# Patient Record
Sex: MALE | Race: BLACK OR AFRICAN AMERICAN | NOT HISPANIC OR LATINO | URBAN - METROPOLITAN AREA
[De-identification: names, ages, dates, MRNs, and addresses within clinical notes are randomized per-mention and may not be internally consistent; named-entity substitution may affect disease eponyms.]

---

## 2019-02-25 VITALS
OXYGEN SATURATION: 96 % | HEIGHT: 74 IN | SYSTOLIC BLOOD PRESSURE: 159 MMHG | HEART RATE: 94 BPM | WEIGHT: 197.09 LBS | DIASTOLIC BLOOD PRESSURE: 95 MMHG | RESPIRATION RATE: 16 BRPM | TEMPERATURE: 99 F

## 2019-02-25 NOTE — PRE-OP CHECKLIST - IV STARTED
 Sinus complaint     Substance abuse     Swelling        Past Surgical History:   Procedure Laterality Date    ADENOIDECTOMY      APPENDECTOMY      CARDIOVERSION      x 2     CHOLECYSTECTOMY      COLONOSCOPY  05/17/2013    Dr. Moisés Barber COLONOSCOPY  03/22/2012    Dr Tyrone Jha yr recall    COLONOSCOPY  06/30/2008    Dr Yvan Gallegos, 3 yr recall    HEMICOLECTOMY Right 1998    2 FT OF COLON REMOVED DUE TO CA    HYSTERECTOMY      [place] COLONOSCOPY W/BIOPSY SINGLE/MULTIPLE N/A 6/6/2017    Dr Timothy Jean Baptiste colon anastomosis-Tubular AP (-) dysplasia x 2--3 yr recall    TONSILLECTOMY      TUMOR REMOVAL      UPPER GASTROINTESTINAL ENDOSCOPY  05/17/2013    Dr. Dylon Salas ulcer disease-Chelo (+)    UPPER GASTROINTESTINAL ENDOSCOPY  03/28/2012    Dr Darinel Ponce antral ulceration with a visible vessel       Recent Hospitalizations  ·     Significant Injuries  ·     Family History   Problem Relation Age of Onset    No Known Problems Mother     No Known Problems Father     Colon Cancer Daughter     Colon Polyps Daughter     Esophageal Cancer Neg Hx     Liver Cancer Neg Hx     Rectal Cancer Neg Hx     Liver Disease Neg Hx     Stomach Cancer Neg Hx        Social History  History   Smoking Status    Never Smoker   Smokeless Tobacco    Never Used     History   Alcohol Use No     History   Drug Use No         Current Outpatient Prescriptions   Medication Sig Dispense Refill    furosemide (LASIX) 40 MG tablet TAKE 1 TABLET BY MOUTH DAILY 90 tablet 2    sotalol (BETAPACE) 80 MG tablet Take 1 tablet by mouth 2 times daily 180 tablet 3    Neomycin-Polymyxin-Dexameth 0.1 % OINT Place 0.5 inches into the left eye 3 times daily      fluticasone (FLONASE) 50 MCG/ACT nasal spray 1 spray by Nasal route 2 times daily      vitamin D (ERGOCALCIFEROL) 41400 UNITS CAPS capsule Take 50,000 Units by mouth Twice a Week      rivaroxaban (XARELTO) 20 MG TABS tablet Take 20 mg by mouth daily (with breakfast)      Cetirizine HCl 10 MG CAPS Take 10 mg by mouth daily      pioglitazone (ACTOS) 30 MG tablet Take 30 mg by mouth daily      gabapentin (NEURONTIN) 300 MG capsule Take 300 mg by mouth 2 times daily      atorvastatin (LIPITOR) 40 MG tablet Take 40 mg by mouth daily      METFORMIN HCL PO Take 1,000 mg by mouth 2 times daily       Sertraline HCl (ZOLOFT PO) Take 50 mg by mouth daily        No current facility-administered medications for this visit. Allergies:  Review of patient's allergies indicates no known allergies.     REVIEW OF SYSTEMS     Constitutional: []Fever []Sweats []Chills [] Recent Injury   [x] Denies all unless marked  HENT:[]Headache  [] Head Injury  [] Sore Throat  [] Ear Pain  [] Dizziness [] Hearing Loss   [x] Denies all unless marked  Spine:  [] Neck pain  [] Back pain  [] Sciaticia  [x] Denies all unless marked  Cardiovascular:[]Chest Pain []Palpitations [] Heart Disease  [x] Denies all unless marked  Pulmonary: []Shortness of Breath []Cough   [x] Denies all unless marked  Gastrointestinal:  []Abdominal Pain  []Blood in Stool  []Diarrhea []Constipation []Nausea  []Vomiting  [x] Denies all unless marked  Genitourinary:  [] Dysuria [] Frequency  [] Incontinence [] Urgency   [x] Denies all unless marked  Musculoskeletal: [] Arthralgia  [] Myalgias [] Muscle cramps  [] Muscle twitches   [x] Denies all unless marked   Extremities:   [] Pain   [] Swelling   [x] Denies all unless marked  Skin:[] Rash  [] Color Change  [x] Denies all unless marked  Neurological:[] Visual Disturbance [] Double Vision [] Slurred Speech [] Trouble swallowing  [] Vertigo [] Tingling [] Numbness [] Weakness [] Loss of Balance   [] Loss of Consciousness [] Memory Loss  [x] Denies all unless marked  Psychiatric/Behavioral:[] Depression [] Anxiety  [x] Denies all unless marked  Sleep: []  Insomnia [] Sleep Disturbance [] Snoring [x] Restless Legs [] Daytime Sleepiness [x] Sleep Apnea  [x] Denies all no

## 2019-02-25 NOTE — PRE-OP CHECKLIST - DNR CLARIFICATION FORM COMPLETED
Patient arrives with complaints of fevers and chills for the past 4 days.  States that he has been having increased weakness and dizziness.  Denies cough or cold symptoms.  Denies urinary symptoms.  Denies nausea or vomiting, denies diarrhea.  Patient is alert/oriented.  States that fevers have persisted despite taking antipyretics.  Reports that he had dental surgery a few weeks ago but denies abscess or drainage.  
n/a

## 2019-02-26 ENCOUNTER — INPATIENT (INPATIENT)
Facility: HOSPITAL | Age: 44
LOS: 4 days | Discharge: HOME | DRG: 460 | End: 2019-03-03
Attending: ORTHOPAEDIC SURGERY | Admitting: ORTHOPAEDIC SURGERY
Payer: OTHER MISCELLANEOUS

## 2019-02-26 DIAGNOSIS — M54.16 RADICULOPATHY, LUMBAR REGION: ICD-10-CM

## 2019-02-26 RX ORDER — SENNA PLUS 8.6 MG/1
2 TABLET ORAL AT BEDTIME
Qty: 0 | Refills: 0 | Status: DISCONTINUED | OUTPATIENT
Start: 2019-02-26 | End: 2019-03-03

## 2019-02-26 RX ORDER — HYDROMORPHONE HYDROCHLORIDE 2 MG/ML
0.5 INJECTION INTRAMUSCULAR; INTRAVENOUS; SUBCUTANEOUS
Qty: 0 | Refills: 0 | Status: DISCONTINUED | OUTPATIENT
Start: 2019-02-26 | End: 2019-02-26

## 2019-02-26 RX ORDER — HYDROMORPHONE HYDROCHLORIDE 2 MG/ML
30 INJECTION INTRAMUSCULAR; INTRAVENOUS; SUBCUTANEOUS
Qty: 0 | Refills: 0 | Status: DISCONTINUED | OUTPATIENT
Start: 2019-02-26 | End: 2019-02-26

## 2019-02-26 RX ORDER — CEFAZOLIN SODIUM 1 G
2000 VIAL (EA) INJECTION EVERY 8 HOURS
Qty: 0 | Refills: 0 | Status: COMPLETED | OUTPATIENT
Start: 2019-02-26 | End: 2019-02-27

## 2019-02-26 RX ORDER — GABAPENTIN 400 MG/1
300 CAPSULE ORAL THREE TIMES A DAY
Qty: 0 | Refills: 0 | Status: DISCONTINUED | OUTPATIENT
Start: 2019-02-26 | End: 2019-02-26

## 2019-02-26 RX ORDER — DEXAMETHASONE 0.5 MG/5ML
10 ELIXIR ORAL EVERY 8 HOURS
Qty: 0 | Refills: 0 | Status: COMPLETED | OUTPATIENT
Start: 2019-02-26 | End: 2019-02-27

## 2019-02-26 RX ORDER — GABAPENTIN 400 MG/1
1 CAPSULE ORAL
Qty: 0 | Refills: 0 | COMMUNITY

## 2019-02-26 RX ORDER — SODIUM CHLORIDE 9 MG/ML
1000 INJECTION, SOLUTION INTRAVENOUS
Qty: 0 | Refills: 0 | Status: DISCONTINUED | OUTPATIENT
Start: 2019-02-26 | End: 2019-03-03

## 2019-02-26 RX ORDER — OXYCODONE HYDROCHLORIDE 5 MG/1
10 TABLET ORAL EVERY 4 HOURS
Qty: 0 | Refills: 0 | Status: DISCONTINUED | OUTPATIENT
Start: 2019-02-26 | End: 2019-03-01

## 2019-02-26 RX ORDER — ACETAMINOPHEN 500 MG
650 TABLET ORAL EVERY 4 HOURS
Qty: 0 | Refills: 0 | Status: DISCONTINUED | OUTPATIENT
Start: 2019-02-26 | End: 2019-03-03

## 2019-02-26 RX ORDER — CEFAZOLIN SODIUM 1 G
2000 VIAL (EA) INJECTION EVERY 8 HOURS
Qty: 0 | Refills: 0 | Status: DISCONTINUED | OUTPATIENT
Start: 2019-02-26 | End: 2019-02-26

## 2019-02-26 RX ORDER — OXYCODONE HYDROCHLORIDE 5 MG/1
5 TABLET ORAL EVERY 4 HOURS
Qty: 0 | Refills: 0 | Status: DISCONTINUED | OUTPATIENT
Start: 2019-02-26 | End: 2019-03-01

## 2019-02-26 RX ORDER — HYDROMORPHONE HYDROCHLORIDE 2 MG/ML
0.5 INJECTION INTRAMUSCULAR; INTRAVENOUS; SUBCUTANEOUS EVERY 4 HOURS
Qty: 0 | Refills: 0 | Status: DISCONTINUED | OUTPATIENT
Start: 2019-02-26 | End: 2019-03-03

## 2019-02-26 RX ORDER — DOCUSATE SODIUM 100 MG
100 CAPSULE ORAL THREE TIMES A DAY
Qty: 0 | Refills: 0 | Status: DISCONTINUED | OUTPATIENT
Start: 2019-02-26 | End: 2019-03-03

## 2019-02-26 RX ORDER — NALOXONE HYDROCHLORIDE 4 MG/.1ML
0.1 SPRAY NASAL
Qty: 0 | Refills: 0 | Status: DISCONTINUED | OUTPATIENT
Start: 2019-02-26 | End: 2019-02-26

## 2019-02-26 RX ORDER — ONDANSETRON 8 MG/1
4 TABLET, FILM COATED ORAL EVERY 6 HOURS
Qty: 0 | Refills: 0 | Status: DISCONTINUED | OUTPATIENT
Start: 2019-02-26 | End: 2019-02-26

## 2019-02-26 RX ORDER — DICLOFENAC SODIUM 75 MG/1
1 TABLET, DELAYED RELEASE ORAL
Qty: 0 | Refills: 0 | COMMUNITY

## 2019-02-26 RX ADMIN — HYDROMORPHONE HYDROCHLORIDE 0.5 MILLIGRAM(S): 2 INJECTION INTRAMUSCULAR; INTRAVENOUS; SUBCUTANEOUS at 22:25

## 2019-02-26 RX ADMIN — HYDROMORPHONE HYDROCHLORIDE 0.5 MILLIGRAM(S): 2 INJECTION INTRAMUSCULAR; INTRAVENOUS; SUBCUTANEOUS at 22:40

## 2019-02-26 NOTE — H&P ADULT - NSHPPHYSICALEXAM_GEN_ALL_CORE
MSK:  Decreased ROM of lumbar spine secondary to pain.  TA/Gastro 5/5 b/l  EHL/FHL 5/5 RLE  EHL/FHL 4/5 LLE  DP's palpable, brisk cap refill b/l  Gross sensation to light touch mildly diminished on anterolateral aspect of LLE  Remainder of PE as per medical clearance

## 2019-02-26 NOTE — H&P ADULT - PROBLEM SELECTOR PLAN 1
Admit to orthopedics.  Presents for elective lumbar decompression and fusion L4-S1.   Medically optimized and cleared for surgery by Dr. Danis Daugherty.

## 2019-02-26 NOTE — H&P ADULT - HISTORY OF PRESENT ILLNESS
42yo male s/p mechanical fall at work in august 13 2018 co lumbar spine pain radiating down lower extremities >6months.  Pt states pain radiates down both lower extremities but is more prominent down LLE down to his left foot.  Pt admits to numbness and tingling down LLE but not in RLE.Pt states pain began immediately after fall and has progressed, pt states that it is worse when standing or sitting for prolonged periods of time. Pt takes diclofenac, ibuprofen, and gabapentin for his lumbar spine pain as prescribed by his pain management doctor without relief. Pt ambulates with a cane. Pt has attempted and failed conservative treatment for his  lumbar spine pain consisting of PT.  Pt not on any anticoagulation meds and denies hx of DVT.  Pt admits to eating "a few french fries" at 9AM this morning. Pt denies fever, chills, recent illness, CP, SOB, N/V, or any other complaints.   Pt presents for lumbar decompression and fusion L4-S1.

## 2019-02-26 NOTE — H&P ADULT - NSHPLABSRESULTS_GEN_ALL_CORE
Preop cbc, bmp, pt/inr, ptt, ua wnl reviewed by med clearance  preop ekg wnl reviewed by med clearance

## 2019-02-26 NOTE — BRIEF OPERATIVE NOTE - PROCEDURE
<<-----Click on this checkbox to enter Procedure Lumbar fusion with pedicle screw  02/26/2019    Active  ANTONIO

## 2019-02-27 LAB
ANION GAP SERPL CALC-SCNC: 13 MMOL/L — SIGNIFICANT CHANGE UP (ref 5–17)
BASOPHILS # BLD AUTO: 0 K/UL — SIGNIFICANT CHANGE UP (ref 0–0.2)
BASOPHILS NFR BLD AUTO: 0 % — SIGNIFICANT CHANGE UP (ref 0–2)
BUN SERPL-MCNC: 15 MG/DL — SIGNIFICANT CHANGE UP (ref 7–23)
CALCIUM SERPL-MCNC: 9.9 MG/DL — SIGNIFICANT CHANGE UP (ref 8.4–10.5)
CHLORIDE SERPL-SCNC: 105 MMOL/L — SIGNIFICANT CHANGE UP (ref 96–108)
CO2 SERPL-SCNC: 23 MMOL/L — SIGNIFICANT CHANGE UP (ref 22–31)
CREAT SERPL-MCNC: 1.27 MG/DL — SIGNIFICANT CHANGE UP (ref 0.5–1.3)
EOSINOPHIL # BLD AUTO: 0 K/UL — SIGNIFICANT CHANGE UP (ref 0–0.5)
EOSINOPHIL NFR BLD AUTO: 0 % — SIGNIFICANT CHANGE UP (ref 0–6)
GLUCOSE SERPL-MCNC: 138 MG/DL — HIGH (ref 70–99)
HCT VFR BLD CALC: 38.9 % — LOW (ref 39–50)
HGB BLD-MCNC: 12.6 G/DL — LOW (ref 13–17)
IMM GRANULOCYTES NFR BLD AUTO: 0.5 % — SIGNIFICANT CHANGE UP (ref 0–1.5)
LYMPHOCYTES # BLD AUTO: 0.62 K/UL — LOW (ref 1–3.3)
LYMPHOCYTES # BLD AUTO: 7.2 % — LOW (ref 13–44)
MCHC RBC-ENTMCNC: 28.8 PG — SIGNIFICANT CHANGE UP (ref 27–34)
MCHC RBC-ENTMCNC: 32.4 GM/DL — SIGNIFICANT CHANGE UP (ref 32–36)
MCV RBC AUTO: 89 FL — SIGNIFICANT CHANGE UP (ref 80–100)
MONOCYTES # BLD AUTO: 0.11 K/UL — SIGNIFICANT CHANGE UP (ref 0–0.9)
MONOCYTES NFR BLD AUTO: 1.3 % — LOW (ref 2–14)
NEUTROPHILS # BLD AUTO: 7.81 K/UL — HIGH (ref 1.8–7.4)
NEUTROPHILS NFR BLD AUTO: 91 % — HIGH (ref 43–77)
NRBC # BLD: 0 /100 WBCS — SIGNIFICANT CHANGE UP (ref 0–0)
PLATELET # BLD AUTO: 257 K/UL — SIGNIFICANT CHANGE UP (ref 150–400)
POTASSIUM SERPL-MCNC: 5 MMOL/L — SIGNIFICANT CHANGE UP (ref 3.5–5.3)
POTASSIUM SERPL-SCNC: 5 MMOL/L — SIGNIFICANT CHANGE UP (ref 3.5–5.3)
RBC # BLD: 4.37 M/UL — SIGNIFICANT CHANGE UP (ref 4.2–5.8)
RBC # FLD: 13.2 % — SIGNIFICANT CHANGE UP (ref 10.3–14.5)
SODIUM SERPL-SCNC: 141 MMOL/L — SIGNIFICANT CHANGE UP (ref 135–145)
WBC # BLD: 8.58 K/UL — SIGNIFICANT CHANGE UP (ref 3.8–10.5)
WBC # FLD AUTO: 8.58 K/UL — SIGNIFICANT CHANGE UP (ref 3.8–10.5)

## 2019-02-27 RX ADMIN — HYDROMORPHONE HYDROCHLORIDE 0.5 MILLIGRAM(S): 2 INJECTION INTRAMUSCULAR; INTRAVENOUS; SUBCUTANEOUS at 05:57

## 2019-02-27 RX ADMIN — OXYCODONE HYDROCHLORIDE 10 MILLIGRAM(S): 5 TABLET ORAL at 10:30

## 2019-02-27 RX ADMIN — OXYCODONE HYDROCHLORIDE 10 MILLIGRAM(S): 5 TABLET ORAL at 01:20

## 2019-02-27 RX ADMIN — Medication 100 MILLIGRAM(S): at 14:43

## 2019-02-27 RX ADMIN — OXYCODONE HYDROCHLORIDE 10 MILLIGRAM(S): 5 TABLET ORAL at 22:30

## 2019-02-27 RX ADMIN — Medication 2000 MILLIGRAM(S): at 00:34

## 2019-02-27 RX ADMIN — Medication 100 MILLIGRAM(S): at 05:16

## 2019-02-27 RX ADMIN — OXYCODONE HYDROCHLORIDE 10 MILLIGRAM(S): 5 TABLET ORAL at 18:30

## 2019-02-27 RX ADMIN — SENNA PLUS 2 TABLET(S): 8.6 TABLET ORAL at 21:54

## 2019-02-27 RX ADMIN — Medication 102 MILLIGRAM(S): at 17:39

## 2019-02-27 RX ADMIN — Medication 100 MILLIGRAM(S): at 21:54

## 2019-02-27 RX ADMIN — OXYCODONE HYDROCHLORIDE 10 MILLIGRAM(S): 5 TABLET ORAL at 09:52

## 2019-02-27 RX ADMIN — Medication 102 MILLIGRAM(S): at 08:56

## 2019-02-27 RX ADMIN — Medication 102 MILLIGRAM(S): at 00:55

## 2019-02-27 RX ADMIN — Medication 2000 MILLIGRAM(S): at 08:56

## 2019-02-27 RX ADMIN — OXYCODONE HYDROCHLORIDE 10 MILLIGRAM(S): 5 TABLET ORAL at 17:38

## 2019-02-27 RX ADMIN — OXYCODONE HYDROCHLORIDE 10 MILLIGRAM(S): 5 TABLET ORAL at 21:55

## 2019-02-27 RX ADMIN — HYDROMORPHONE HYDROCHLORIDE 0.5 MILLIGRAM(S): 2 INJECTION INTRAMUSCULAR; INTRAVENOUS; SUBCUTANEOUS at 05:16

## 2019-02-27 RX ADMIN — OXYCODONE HYDROCHLORIDE 10 MILLIGRAM(S): 5 TABLET ORAL at 14:39

## 2019-02-27 RX ADMIN — OXYCODONE HYDROCHLORIDE 10 MILLIGRAM(S): 5 TABLET ORAL at 00:39

## 2019-02-27 RX ADMIN — OXYCODONE HYDROCHLORIDE 10 MILLIGRAM(S): 5 TABLET ORAL at 13:19

## 2019-02-27 NOTE — DISCHARGE NOTE PROVIDER - NSDCFUADDINST_GEN_ALL_CORE_FT
No strenuous activity (bending/twisting), heavy lifting, driving or returning to work until cleared by MD.  Change dressing daily with gauze/tape till post-op day #5, then leave incision open to air.  You may shower post-op day#5, keep incision clean and dry.   Try to have regular bowel movements, take stool softener or laxative if necessary.  May take pepcid or zantac for upset stomach.  May apply ice to affected areas to decrease swelling.  Call to schedule an appt with Dr. Daugherty for follow up, if you have staples or sutures they will be removed in office.  Contact your doctor if you experience: fever greater than 101.5, chills, chest pain, difficulty breathing, redness or excessive drainage around the incision, other concerns.  Follow up with your primary care provider.

## 2019-02-27 NOTE — PHYSICAL THERAPY INITIAL EVALUATION ADULT - GENERAL OBSERVATIONS, REHAB EVAL
Patient received semi-supine in no acute distress on room air, +Telemetry, +SCDs, +Hemovac. Cleared by JULIO Bowie

## 2019-02-27 NOTE — DISCHARGE NOTE PROVIDER - NSDCCPCAREPLAN_GEN_ALL_CORE_FT
PRINCIPAL DISCHARGE DIAGNOSIS  Problem: Lumbar radiculopathy  Assessment and Plan of Treatment: improvement s/p PSF L4-S1

## 2019-02-27 NOTE — DISCHARGE NOTE PROVIDER - HOSPITAL COURSE
Admitted    Surgery - PSF L4-S1    Renetta-op Antibiotics    Pain control    DVT prophylaxis    OOB/Physical Therapy

## 2019-02-27 NOTE — DISCHARGE NOTE PROVIDER - CARE PROVIDER_API CALL
Raf Daugherty (DO)  Orthopaedic Surgery  [address] [place], [place] [address]  Phone: [phone]  Fax: [phone]  Follow Up Time: 2 weeks

## 2019-02-27 NOTE — PHYSICAL THERAPY INITIAL EVALUATION ADULT - GAIT DEVIATIONS NOTED, PT EVAL
decreased federico/decreased stride length/decreased step length/decreased velocity of limb motion/increased time in double stance/decreased swing-to-stance ratio

## 2019-02-27 NOTE — PROGRESS NOTE ADULT - SUBJECTIVE AND OBJECTIVE BOX
pt seen and examined nad avss    pe dressing cdi  nvi  power5/5   sensation grossly intact  no calf tenderness    imp sp psf    plan   ambulation pt   pain control  scd  drain  xrays

## 2019-02-27 NOTE — DISCHARGE NOTE PROVIDER - NSDCACTIVITY_GEN_ALL_CORE
Stairs allowed/Walking - Outdoors allowed/Do not drive or operate machinery/Walking - Indoors allowed/No heavy lifting/straining

## 2019-02-27 NOTE — PROGRESS NOTE ADULT - SUBJECTIVE AND OBJECTIVE BOX
Pain Management Progress Note - [facility] [phone]      HPI: Patient seen and examined today in preop, patient in NAD. Patient scheduled for Lumbar Decompression and fusion L4-S1 post op day 1, patient complains of lower back pain is addressed with current pain medication regimen. Patient Axox3, denies n,v, no s/s of oversedation.           Pain is ___ sharp _x___dull ___burning _x__achy ___ Intensity: ____ mild _x__mod __x_severe     Location __x__surgical site ____cervical __x___lumbar ____abd ____upper ext____lower ext    Worse with _x_activity __x__movement _____physical therapy___ Rest    Improved with __x__medication _x___rest ____physical therapy      HYDROmorphone PCA (1 mG/mL)  HYDROmorphone PCA (1 mG/mL) Rescue Clinician Bolus  naloxone Injectable  ondansetron Injectable  gabapentin  lactated ringers.  ceFAZolin   IVPB  dexamethasone  IVPB  aluminum hydroxide/magnesium hydroxide/simethicone Suspension  ondansetron Injectable  docusate sodium  senna  ceFAZolin  Injectable.  acetaminophen   Tablet ..  oxyCODONE    IR  HYDROmorphone  Injectable      ROS: Const:  _-__febrile   Eyes:___ENT:___CV: _-__chest pain  Resp: _-___sob  GI:_-__nausea _-__vomiting ___abd pain ___npo ___clears _x_full diet __bm  :___ Musk: x___pain _x__spasm  Skin:___ Neuro:  _-__ofavlwwl__-_qjynvliud_r__ numbness _x__weakness __-_paresth  Psych:_-_anxiety  Endo:___ Heme:___Allergy:_________, _x__all others reviewed and negative        PAST MEDICAL & SURGICAL HISTORY:  Lumbar radiculopathy  Lumbar radiculopathy  No significant past surgical history      02-27 @ 05:5568 mL/min/1.73M2      Hemoglobin: 12.6 g/dL (02-27 @ 05:55)        T(C): 36.8 (02-27-19 @ 08:13), Max: 37.1 (02-27-19 @ 00:05)  HR: 97 (02-27-19 @ 08:13) (76 - 98)  BP: 130/70 (02-27-19 @ 08:13) (119/76 - 152/94)  RR: 17 (02-27-19 @ 08:13) (13 - 27)  SpO2: 97% (02-27-19 @ 08:13) (97% - 100%)  Wt(kg): --       PHYSICAL EXAM:  Gen Appearance: _x__no acute distress _x__appropriate        Neuro: _x__SILT feet____ EOM Intact Psych: AAOX_3_, x___mood/affect appropriate        Eyes: _x__conjunctiva WNL  ___x__ Pupils equal and round        ENT: _x__ears and nose atraumatic_x__ Hearing grossly intact        Neck: _x__trachea midline, no visible masses ___thyroid without palpable mass    Resp: _x__Nml WOB____No tactile fremitus ___clear to auscultation    Cardio: _x__extremities free from edema __x__pedal pulses palpable    GI/Abdomen: _x__soft ___x__ Nontender___x___Nondistended_____HSM    Lymphatic: ___no palpable nodes in neck  ___no palpable nodes calves and feet    Skin/Wound: ___Incision, _x__Dressing c/d/i,   ____surrounding tissues soft,  ___drain/chest tube present____    Muscular: EHL _5__/5  Gastrocnemius_5__/5    ___absent clubbing/cyanosis        ASSESSMENT: This is a 43y old Male with a history of lumbar radiculopathy, scheduled for Decompression L4-L5, post op day 1, pain controlled with current pain medication regimen.       Recommended Treatment PLAN:  1. Oxycodone 5-10mg Po Q4h prn moderate to severe pain  2. Dilaudid 0.5mg Q2h IVP prn breakthrough pain  3. Gabapentin 300mg Po TID  Plan discussed with Dr. Huseyin Mendoza

## 2019-02-27 NOTE — PHYSICAL THERAPY INITIAL EVALUATION ADULT - PERTINENT HX OF CURRENT PROBLEM, REHAB EVAL
42yo male s/p mechanical fall at work in august 13 2018 co lumbar spine pain radiating down lower extremities >6months.  Pt states pain radiates down both lower extremities but is more prominent down LLE down to his left foot. Pt admits to numbness and tingling down LLE but not in RLE.Pt has attempted and failed conservative treatment for his  lumbar spine pain consisting of PT. lumbar decompression and fusion L4-S1.

## 2019-02-27 NOTE — PHYSICAL THERAPY INITIAL EVALUATION ADULT - LIGHT TOUCH SENSATION, LLE, REHAB EVAL
except for decreased sensation on dorsum/plantar foot, lateral malleolus, lateral knee condyle./within normal limits

## 2019-02-27 NOTE — PHYSICAL THERAPY INITIAL EVALUATION ADULT - ADDITIONAL COMMENTS
Patient lives with spouse with 4 children in private house with no steps to enter. Ambulates with SC at baseline. Denies other Hx of falls.

## 2019-02-27 NOTE — PROGRESS NOTE ADULT - SUBJECTIVE AND OBJECTIVE BOX
Ortho Post Op Check    Procedure: lami and PSF L4-S1  Surgeon: Willow     Pt comfortable without complaints, pain controlled  Denies CP, SOB, N/V, numbness/tingling     Vital Signs Last 24 Hrs  T(C): 37.1 (02-27-19 @ 00:05), Max: 37.1 (02-27-19 @ 00:05)  T(F): 98.8 (02-27-19 @ 00:05), Max: 98.8 (02-27-19 @ 00:05)  HR: 98 (02-27-19 @ 00:05) (76 - 98)  BP: 139/88 (02-27-19 @ 00:05) (119/76 - 152/94)  BP(mean): 105 (02-26-19 @ 23:55) (102 - 121)  RR: 18 (02-27-19 @ 00:05) (13 - 27)  SpO2: 98% (02-27-19 @ 00:05) (97% - 100%)    General: Pt Alert and oriented, NAD  Back DSG C/D/I  HVx1in place holding good suction   Pulses: 2+ DP  Sensation: s/s/s/p/dp/t intact  Motor: EHL/FHL/TA/GS 5/5      Post-op X-Ray: intraop fluoro     A/P: 43yMale POD#0 s/p   - Stable  - Pain Control  - DVT ppx: SCDs  - Post op abx: ancef  - PT, WBS: WBAT  - monitor drain  - f/u Am labs  - dispo home    Ortho Pager [phone]

## 2019-02-27 NOTE — PROGRESS NOTE ADULT - SUBJECTIVE AND OBJECTIVE BOX
POST OPERATIVE DAY #: 0  STATUS POST: s/p PSF L4-S1 2/26/19             SUBJECTIVE: Patient seen and examined. States to some pain 6/10 but improves with medication. patient denies any CP, SOB, fever, chills, numbness/tingling, weakness, dizziness. States "his heart is beating fast" but no other symptoms. Currently with a murphy.       OBJECTIVE:     Vital Signs Last 24 Hrs  T(C): 36.8 (27 Feb 2019 08:13), Max: 37.1 (27 Feb 2019 00:05)  T(F): 98.2 (27 Feb 2019 08:13), Max: 98.8 (27 Feb 2019 00:05)  HR: 97 (27 Feb 2019 08:13) (76 - 98)  BP: 130/70 (27 Feb 2019 08:13) (119/76 - 152/94)  BP(mean): 105 (26 Feb 2019 23:55) (102 - 121)  RR: 17 (27 Feb 2019 08:13) (13 - 27)  SpO2: 97% (27 Feb 2019 08:13) (97% - 100%)    Affected extremity:          Dressing: clean/dry/intact          Sensation: intact to light touch but states to slight less sensation of the lateral left foot compared to right which was present prior to surgery         Motor exam:  5 / 5 EHL          warm, well-perfused; capillary refill < 3 seconds          HV intact    I&O's Detail    26 Feb 2019 07:01  -  27 Feb 2019 07:00  --------------------------------------------------------  IN:    lactated ringers.: 1375 mL    Oral Fluid: 200 mL  Total IN: 1575 mL    OUT:    Accordian: 140 mL    Indwelling Catheter - Urethral: 745 mL  Total OUT: 885 mL    Total NET: 690 mL      27 Feb 2019 07:01  -  27 Feb 2019 10:06  --------------------------------------------------------  IN:  Total IN: 0 mL    OUT:    Indwelling Catheter - Urethral: 550 mL  Total OUT: 550 mL    Total NET: -550 mL          LABS:                        12.6   8.58  )-----------( 257      ( 27 Feb 2019 05:55 )             38.9     02-27    141  |  105  |  15  ----------------------------<  138<H>  5.0   |  23  |  1.27    Ca    9.9      27 Feb 2019 05:55            MEDICATIONS:    acetaminophen   Tablet .. 650 milliGRAM(s) Oral every 4 hours PRN  HYDROmorphone  Injectable 0.5 milliGRAM(s) IV Push every 4 hours PRN  oxyCODONE    IR 5 milliGRAM(s) Oral every 4 hours PRN  oxyCODONE    IR 10 milliGRAM(s) Oral every 4 hours PRN            ASSESSMENT AND PLAN:     1. Analgesic pain control  2. DVT prophylaxis: SCDs       3. Xray to be taken tomorrow  4. Has tachycardia recently without any other symptoms. Continue to monitor  5. HV ouput: 140 shift - continue to monitor  6. Weight Bearing Status:  Weight bearing as tolerated         7. Disposition: Home   when PT cleared and medically cleared POST OPERATIVE DAY #: 1  STATUS POST: s/p PSF L4-S1 2/26/19             SUBJECTIVE: Patient seen and examined. States to some pain 6/10 but improves with medication. patient denies any CP, SOB, fever, chills, numbness/tingling, weakness, dizziness. States "his heart is beating fast" but no other symptoms. Currently with a murphy.       OBJECTIVE:     Vital Signs Last 24 Hrs  T(C): 36.8 (27 Feb 2019 08:13), Max: 37.1 (27 Feb 2019 00:05)  T(F): 98.2 (27 Feb 2019 08:13), Max: 98.8 (27 Feb 2019 00:05)  HR: 97 (27 Feb 2019 08:13) (76 - 98)  BP: 130/70 (27 Feb 2019 08:13) (119/76 - 152/94)  BP(mean): 105 (26 Feb 2019 23:55) (102 - 121)  RR: 17 (27 Feb 2019 08:13) (13 - 27)  SpO2: 97% (27 Feb 2019 08:13) (97% - 100%)    Affected extremity:          Dressing: clean/dry/intact          Sensation: intact to light touch but states to slight less sensation of the lateral left foot compared to right which was present prior to surgery         Motor exam:  5 / 5 EHL          warm, well-perfused; capillary refill < 3 seconds          HV intact    I&O's Detail    26 Feb 2019 07:01  -  27 Feb 2019 07:00  --------------------------------------------------------  IN:    lactated ringers.: 1375 mL    Oral Fluid: 200 mL  Total IN: 1575 mL    OUT:    Accordian: 140 mL    Indwelling Catheter - Urethral: 745 mL  Total OUT: 885 mL    Total NET: 690 mL      27 Feb 2019 07:01  -  27 Feb 2019 10:06  --------------------------------------------------------  IN:  Total IN: 0 mL    OUT:    Indwelling Catheter - Urethral: 550 mL  Total OUT: 550 mL    Total NET: -550 mL          LABS:                        12.6   8.58  )-----------( 257      ( 27 Feb 2019 05:55 )             38.9     02-27    141  |  105  |  15  ----------------------------<  138<H>  5.0   |  23  |  1.27    Ca    9.9      27 Feb 2019 05:55            MEDICATIONS:    acetaminophen   Tablet .. 650 milliGRAM(s) Oral every 4 hours PRN  HYDROmorphone  Injectable 0.5 milliGRAM(s) IV Push every 4 hours PRN  oxyCODONE    IR 5 milliGRAM(s) Oral every 4 hours PRN  oxyCODONE    IR 10 milliGRAM(s) Oral every 4 hours PRN            ASSESSMENT AND PLAN:     1. Analgesic pain control  2. DVT prophylaxis: SCDs       3. Xray to be taken tomorrow  4. Has tachycardia recently without any other symptoms. Continue to monitor  5. HV ouput: 140 shift - continue to monitor  6. Pain management consult appreciated  7. Weight Bearing Status:  Weight bearing as tolerated         8. Disposition: Home   when PT cleared and medically cleared

## 2019-02-27 NOTE — PHYSICAL THERAPY INITIAL EVALUATION ADULT - CRITERIA FOR SKILLED THERAPEUTIC INTERVENTIONS
therapy frequency/anticipated discharge recommendation/impairments found/functional limitations in following categories/risk reduction/prevention/predicted duration of therapy intervention

## 2019-02-27 NOTE — PHYSICAL THERAPY INITIAL EVALUATION ADULT - PLANNED THERAPY INTERVENTIONS, PT EVAL
neuromuscular re-education/transfer training/gait training/balance training/postural re-education/strengthening/bed mobility training

## 2019-02-28 LAB
ANION GAP SERPL CALC-SCNC: 11 MMOL/L — SIGNIFICANT CHANGE UP (ref 5–17)
BASOPHILS # BLD AUTO: 0 K/UL — SIGNIFICANT CHANGE UP (ref 0–0.2)
BASOPHILS NFR BLD AUTO: 0 % — SIGNIFICANT CHANGE UP (ref 0–2)
BUN SERPL-MCNC: 19 MG/DL — SIGNIFICANT CHANGE UP (ref 7–23)
CALCIUM SERPL-MCNC: 9.8 MG/DL — SIGNIFICANT CHANGE UP (ref 8.4–10.5)
CHLORIDE SERPL-SCNC: 102 MMOL/L — SIGNIFICANT CHANGE UP (ref 96–108)
CO2 SERPL-SCNC: 26 MMOL/L — SIGNIFICANT CHANGE UP (ref 22–31)
CREAT SERPL-MCNC: 1.14 MG/DL — SIGNIFICANT CHANGE UP (ref 0.5–1.3)
EOSINOPHIL # BLD AUTO: 0 K/UL — SIGNIFICANT CHANGE UP (ref 0–0.5)
EOSINOPHIL NFR BLD AUTO: 0 % — SIGNIFICANT CHANGE UP (ref 0–6)
GLUCOSE SERPL-MCNC: 130 MG/DL — HIGH (ref 70–99)
HCT VFR BLD CALC: 37.7 % — LOW (ref 39–50)
HGB BLD-MCNC: 12.2 G/DL — LOW (ref 13–17)
IMM GRANULOCYTES NFR BLD AUTO: 0.5 % — SIGNIFICANT CHANGE UP (ref 0–1.5)
LYMPHOCYTES # BLD AUTO: 0.93 K/UL — LOW (ref 1–3.3)
LYMPHOCYTES # BLD AUTO: 6.2 % — LOW (ref 13–44)
MCHC RBC-ENTMCNC: 28.5 PG — SIGNIFICANT CHANGE UP (ref 27–34)
MCHC RBC-ENTMCNC: 32.4 GM/DL — SIGNIFICANT CHANGE UP (ref 32–36)
MCV RBC AUTO: 88.1 FL — SIGNIFICANT CHANGE UP (ref 80–100)
MONOCYTES # BLD AUTO: 1.24 K/UL — HIGH (ref 0–0.9)
MONOCYTES NFR BLD AUTO: 8.2 % — SIGNIFICANT CHANGE UP (ref 2–14)
NEUTROPHILS # BLD AUTO: 12.81 K/UL — HIGH (ref 1.8–7.4)
NEUTROPHILS NFR BLD AUTO: 85.1 % — HIGH (ref 43–77)
NRBC # BLD: 0 /100 WBCS — SIGNIFICANT CHANGE UP (ref 0–0)
PLATELET # BLD AUTO: 251 K/UL — SIGNIFICANT CHANGE UP (ref 150–400)
POTASSIUM SERPL-MCNC: 4.2 MMOL/L — SIGNIFICANT CHANGE UP (ref 3.5–5.3)
POTASSIUM SERPL-SCNC: 4.2 MMOL/L — SIGNIFICANT CHANGE UP (ref 3.5–5.3)
RBC # BLD: 4.28 M/UL — SIGNIFICANT CHANGE UP (ref 4.2–5.8)
RBC # FLD: 13.4 % — SIGNIFICANT CHANGE UP (ref 10.3–14.5)
SODIUM SERPL-SCNC: 139 MMOL/L — SIGNIFICANT CHANGE UP (ref 135–145)
WBC # BLD: 15.05 K/UL — HIGH (ref 3.8–10.5)
WBC # FLD AUTO: 15.05 K/UL — HIGH (ref 3.8–10.5)

## 2019-02-28 PROCEDURE — 72100 X-RAY EXAM L-S SPINE 2/3 VWS: CPT | Mod: 26

## 2019-02-28 PROCEDURE — 99233 SBSQ HOSP IP/OBS HIGH 50: CPT | Mod: GC

## 2019-02-28 RX ADMIN — Medication 100 MILLIGRAM(S): at 21:07

## 2019-02-28 RX ADMIN — OXYCODONE HYDROCHLORIDE 10 MILLIGRAM(S): 5 TABLET ORAL at 19:00

## 2019-02-28 RX ADMIN — Medication 100 MILLIGRAM(S): at 05:19

## 2019-02-28 RX ADMIN — HYDROMORPHONE HYDROCHLORIDE 0.5 MILLIGRAM(S): 2 INJECTION INTRAMUSCULAR; INTRAVENOUS; SUBCUTANEOUS at 20:57

## 2019-02-28 RX ADMIN — OXYCODONE HYDROCHLORIDE 10 MILLIGRAM(S): 5 TABLET ORAL at 06:09

## 2019-02-28 RX ADMIN — OXYCODONE HYDROCHLORIDE 10 MILLIGRAM(S): 5 TABLET ORAL at 05:19

## 2019-02-28 RX ADMIN — HYDROMORPHONE HYDROCHLORIDE 0.5 MILLIGRAM(S): 2 INJECTION INTRAMUSCULAR; INTRAVENOUS; SUBCUTANEOUS at 15:30

## 2019-02-28 RX ADMIN — OXYCODONE HYDROCHLORIDE 10 MILLIGRAM(S): 5 TABLET ORAL at 10:03

## 2019-02-28 RX ADMIN — Medication 100 MILLIGRAM(S): at 14:23

## 2019-02-28 RX ADMIN — SENNA PLUS 2 TABLET(S): 8.6 TABLET ORAL at 21:07

## 2019-02-28 RX ADMIN — HYDROMORPHONE HYDROCHLORIDE 0.5 MILLIGRAM(S): 2 INJECTION INTRAMUSCULAR; INTRAVENOUS; SUBCUTANEOUS at 21:27

## 2019-02-28 RX ADMIN — OXYCODONE HYDROCHLORIDE 10 MILLIGRAM(S): 5 TABLET ORAL at 11:00

## 2019-02-28 RX ADMIN — OXYCODONE HYDROCHLORIDE 10 MILLIGRAM(S): 5 TABLET ORAL at 18:04

## 2019-02-28 RX ADMIN — HYDROMORPHONE HYDROCHLORIDE 0.5 MILLIGRAM(S): 2 INJECTION INTRAMUSCULAR; INTRAVENOUS; SUBCUTANEOUS at 13:51

## 2019-02-28 NOTE — PROGRESS NOTE ADULT - SUBJECTIVE AND OBJECTIVE BOX
Pain Management Progress Note - [facility] [phone]      HPI: Patient seen and examined today in preop, patient in NAD. Patient scheduled for Lumbar Decompression and fusion L4-S1 post op day 2, patient complains of lower back pain is addressed with current pain medication regimen. Patient Axox3, denies n,v, no s/s of oversedation, patient tolerating PT.           Pain is ___ sharp _x___dull ___burning _x__achy ___ Intensity: ____ mild _x__mod __x_severe     Location __x__surgical site ____cervical __x___lumbar ____abd ____upper ext____lower ext    Worse with _x_activity __x__movement _____physical therapy___ Rest    Improved with __x__medication _x___rest ____physical therapy      HYDROmorphone PCA (1 mG/mL)  HYDROmorphone PCA (1 mG/mL) Rescue Clinician Bolus  naloxone Injectable  ondansetron Injectable  gabapentin  lactated ringers.  ceFAZolin   IVPB  dexamethasone  IVPB  aluminum hydroxide/magnesium hydroxide/simethicone Suspension  ondansetron Injectable  docusate sodium  senna  ceFAZolin  Injectable.  ceFAZolin  Injectable.  acetaminophen   Tablet ..  oxyCODONE    IR  oxyCODONE    IR  HYDROmorphone  Injectable      ROS: Const:  _-__febrile   Eyes:___ENT:___CV: _-__chest pain  Resp: _-___sob  GI:_-__nausea _-__vomiting ___abd pain ___npo ___clears _x_full diet __bm  :___ Musk: x___pain _x__spasm  Skin:___ Neuro:  _-__pdisjrro__-_izinbyhrb_x__ numbness _x__weakness __-_paresth  Psych:_-_anxiety  Endo:___ Heme:___Allergy:_________, _x__all others reviewed and negative        PAST MEDICAL & SURGICAL HISTORY:  Lumbar radiculopathy  Lumbar radiculopathy  No significant past surgical history      02-28 @ 06:1877 mL/min/1.73M2      Hemoglobin: 12.2 g/dL (02-28 @ 06:18)  Hemoglobin: 12.6 g/dL (02-27 @ 05:55)        T(C): 36.4 (02-28-19 @ 05:04), Max: 36.7 (02-27-19 @ 16:46)  HR: 77 (02-28-19 @ 05:04) (77 - 110)  BP: 126/78 (02-28-19 @ 05:04) (126/78 - 147/85)  RR: 16 (02-28-19 @ 05:04) (16 - 18)  SpO2: 100% (02-28-19 @ 05:04) (97% - 100%)  Wt(kg): --       PHYSICAL EXAM:  Gen Appearance: _x__no acute distress _x__appropriate        Neuro: _x__SILT feet____ EOM Intact Psych: AAOX_3_, x___mood/affect appropriate        Eyes: _x__conjunctiva WNL  ___x__ Pupils equal and round        ENT: _x__ears and nose atraumatic_x__ Hearing grossly intact        Neck: _x__trachea midline, no visible masses ___thyroid without palpable mass    Resp: _x__Nml WOB____No tactile fremitus ___clear to auscultation    Cardio: _x__extremities free from edema __x__pedal pulses palpable    GI/Abdomen: _x__soft ___x__ Nontender___x___Nondistended_____HSM    Lymphatic: ___no palpable nodes in neck  ___no palpable nodes calves and feet    Skin/Wound: ___Incision, _x__Dressing c/d/i,   ____surrounding tissues soft,  ___drain/chest tube present____    Muscular: EHL _5__/5  Gastrocnemius_5__/5    ___absent clubbing/cyanosis        ASSESSMENT: This is a 43y old Male with a history of lumbar radiculopathy, scheduled for Decompression L4-L5, post op day 2, pain controlled with current pain medication regimen.       Recommended Treatment PLAN:  1. Oxycodone 5-10mg Po Q4h prn moderate to severe pain  2. Dilaudid 0.5mg Q2h IVP prn breakthrough pain  3. Gabapentin 300mg Po TID  Plan discussed with Sriram Hernandez at bedside

## 2019-02-28 NOTE — PROGRESS NOTE ADULT - SUBJECTIVE AND OBJECTIVE BOX
POST OPERATIVE DAY #:  2  STATUS POST: PSF l4-s1                      SUBJECTIVE: Patient seen and examined. Has pain to the lumbar spine. No pain to legs, weakness, numbness/tingling. Pt had some shoulder neck discomfort for about 10 minutes which subsided. Pt had a EKG performed and is resting comfortably.     Pain:  well controlled      OBJECTIVE:     Vital Signs Last 24 Hrs  T(C): 35.9 (28 Feb 2019 11:15), Max: 36.7 (27 Feb 2019 16:46)  T(F): 96.7 (28 Feb 2019 11:15), Max: 98 (27 Feb 2019 16:46)  HR: 100 (28 Feb 2019 12:36) (77 - 140)  BP: 125/85 (28 Feb 2019 14:00) (123/96 - 141/82)  BP(mean): --  RR: 18 (28 Feb 2019 14:00) (16 - 18)  SpO2: 100% (28 Feb 2019 14:00) (97% - 100%)    Affected extremity:          Dressing: clean/dry/intact          HV intact         Sensation: intact to light touch          Motor exam:  5 / 5 EHL/TA/GS          warm, well-perfused; capillary refill < 3 seconds              I&O's Detail    27 Feb 2019 07:01  -  28 Feb 2019 07:00  --------------------------------------------------------  IN:    lactated ringers.: 1850 mL    Oral Fluid: 2330 mL  Total IN: 4180 mL    OUT:    Accordian: 180 mL    Indwelling Catheter - Urethral: 550 mL    Voided: 5200 mL  Total OUT: 5930 mL    Total NET: -1750 mL      28 Feb 2019 07:01  -  28 Feb 2019 14:58  --------------------------------------------------------  IN:  Total IN: 0 mL    OUT:    Accordian: 70 mL    Voided: 1200 mL  Total OUT: 1270 mL    Total NET: -1270 mL          LABS:                        12.2   15.05 )-----------( 251      ( 28 Feb 2019 06:18 )             37.7     02-28    139  |  102  |  19  ----------------------------<  130<H>  4.2   |  26  |  1.14    Ca    9.8      28 Feb 2019 06:18         EKG: NSR, unchanged from prior EKG    MEDICATIONS:    acetaminophen   Tablet .. 650 milliGRAM(s) Oral every 4 hours PRN  HYDROmorphone  Injectable 0.5 milliGRAM(s) IV Push every 4 hours PRN  oxyCODONE    IR 5 milliGRAM(s) Oral every 4 hours PRN  oxyCODONE    IR 10 milliGRAM(s) Oral every 4 hours PRN        RADIOLOGY & ADDITIONAL STUDIES: Xray reviewed: Hardware intact in good alignment. No acute fracture or dislocation    ASSESSMENT AND PLAN:     1. Analgesic pain control  2. Continue PT  3. Weight Bearing Status:  Weight bearing as tolerated       4. Disposition: Home when medically stable and PT cleared

## 2019-02-28 NOTE — CONSULT NOTE ADULT - SUBJECTIVE AND OBJECTIVE BOX
History of Present Illness:  Reason for Admission: lumbar spine pain	  History of Present Illness: 	  42yo male s/p mechanical fall at work in august 13 2018 co lumbar spine pain radiating down lower extremities >6months.  Pt states pain radiates down both lower extremities but is more prominent down LLE down to his left foot.  Pt admits to numbness and tingling down LLE but not in RLE.Pt states pain began immediately after fall and has progressed, pt states that it is worse when standing or sitting for prolonged periods of time. Pt takes diclofenac, ibuprofen, and gabapentin for his lumbar spine pain as prescribed by his pain management doctor without relief. Pt ambulates with a cane. Pt has attempted and failed conservative treatment for his  lumbar spine pain consisting of PT.       Review of Systems:  Review of Systems: musculoskeletal: +lumbar spine pain	  Other Review of Systems: All other review of systems negative, except as noted in HPI	      Allergies and Intolerances:        Allergies:  	No Known Allergies:     Home Medications:   * Incomplete Medication History as of 26-Feb-2019 13:42 documented in Structured Notes  · 	Lumbar corset: Last Dose Taken:    · 	ibuprofen 600 mg oral tablet: 1 tab(s) orally every 6 hours, As Needed, Last Dose Taken: 24-Feb-2019   · 	gabapentin 300 mg oral capsule: 1 cap(s) orally once a day, Last Dose Taken: 26-Feb-2019 AM  · 	diclofenac sodium 75 mg oral delayed release tablet: 1 tab(s) orally 2 times a day, As Needed, Last Dose Taken: 19-Feb-2019     Patient History:    Past Medical History:  Lumbar radiculopathy.     Past Surgical History:  No significant past surgical history.     Social History:  Social History (marital status, living situation, occupation, tobacco use, alcohol and drug use, and sexual history): non smoker occasional etoh use	        Patient is a 43y old  Male who presents with a chief complaint of lumbar spine pain (28 Feb 2019 10:46)      INTERVAL HPI/OVERNIGHT EVENTS: No acute events O/N. No complaints at this time.     Review of Systems: 12 point review of systems otherwise negative      MEDICATIONS  (STANDING):  docusate sodium 100 milliGRAM(s) Oral three times a day  lactated ringers. 1000 milliLiter(s) (125 mL/Hr) IV Continuous <Continuous>  senna 2 Tablet(s) Oral at bedtime    MEDICATIONS  (PRN):  acetaminophen   Tablet .. 650 milliGRAM(s) Oral every 4 hours PRN Temp greater or equal to 38C (100.4F), Mild Pain (1 - 3)  aluminum hydroxide/magnesium hydroxide/simethicone Suspension 30 milliLiter(s) Oral every 12 hours PRN Indigestion  HYDROmorphone  Injectable 0.5 milliGRAM(s) IV Push every 4 hours PRN Breakthrough pain  oxyCODONE    IR 5 milliGRAM(s) Oral every 4 hours PRN Moderate Pain (4 - 6)  oxyCODONE    IR 10 milliGRAM(s) Oral every 4 hours PRN Severe Pain (7 - 10)      Allergies    No Known Allergies    Intolerances          Vital Signs Last 24 Hrs  T(C): 35.9 (28 Feb 2019 11:15), Max: 36.7 (27 Feb 2019 16:46)  T(F): 96.7 (28 Feb 2019 11:15), Max: 98 (27 Feb 2019 16:46)  HR: 100 (28 Feb 2019 12:36) (77 - 140)  BP: 123/96 (28 Feb 2019 12:36) (123/96 - 147/85)  BP(mean): --  RR: 16 (28 Feb 2019 12:36) (16 - 18)  SpO2: 100% (28 Feb 2019 12:36) (97% - 100%)  CAPILLARY BLOOD GLUCOSE          02-27 @ 07:01 - 02-28 @ 07:00  --------------------------------------------------------  IN: 4180 mL / OUT: 5930 mL / NET: -1750 mL    02-28 @ 07:01 - 02-28 @ 13:31  --------------------------------------------------------  IN: 0 mL / OUT: 400 mL / NET: -400 mL        Physical Exam:    General:  NAD  HEENT:  Nonicteric  CV:  RRR, no murmur, no JVD  Lungs:  Anteriorly CTA B/L, no wheezes, rales, rhonchi  Abdomen:  Soft, non-tender  Extremities:  No edema  Skin:  Warm and dry, no rashes  Neuro:  Nonfocal  No Restraints    LABS:                        12.2   15.05 )-----------( 251      ( 28 Feb 2019 06:18 )             37.7     02-28    139  |  102  |  19  ----------------------------<  130<H>  4.2   |  26  |  1.14    Ca    9.8      28 Feb 2019 06:18
Pain Management Consult Note - [facility] [phone]      Chief Complaint: Lower Back Pain      HPI: Patient seen and examined today in preop, patient in NAD. Patient scheduled for Lumbar Decompression and fusion L4-S1, patient complains of lower back pain that radiates down to his feet bilaterally. Patient sees Dr. Bello Carrillo for pain management and takes Ibuprofen 600mg PO Q6h prn pain, Gabapentin 300mg Po TID, and diclofenac 50mg Po TID. Discussed plan to start Dilaudid PCA post op, reviewed PCA use and side effects.          Pain is ___ sharp _x___dull ___burning _x__achy ___ Intensity: ____ mild _x__mod __x_severe     Location ____surgical site ____cervical __x___lumbar ____abd ____upper ext____lower ext    Worse with _x_activity __x__movement _____physical therapy___ Rest    Improved with __x__medication _x___rest ____physical therapy      ROS: Const:  _-__febrile   Eyes:___ENT:___CV: _-__chest pain  Resp: _-___sob  GI:_-__nausea _-__vomiting ___abd pain _x__npo ___clears __full diet __bm  :___ Musk: x___pain _x__spasm  Skin:___ Neuro:  _-__pnahvqwy__-_mtmzszhmb_s__ numbness _x__weakness __-_paresth  Psych:_-_anxiety  Endo:___ Heme:___Allergy:_________, _x__all others reviewed and negative        PAST MEDICAL & SURGICAL HISTORY:  Lumbar radiculopathy  Lumbar radiculopathy  No significant past surgical history    SH: _-__Tobacco   _-__Alcohol                          FH:FAMILY HISTORY:          T(C): --  HR: --  BP: --  RR: --  SpO2: --  Wt(kg): --    T(C): --  HR: --  BP: --  RR: --  SpO2: --  Wt(kg): --    T(C): --  HR: --  BP: --  RR: --  SpO2: --  Wt(kg): --      PHYSICAL EXAM:  Gen Appearance: _x__no acute distress _x__appropriate        Neuro: _x__SILT feet____ EOM Intact Psych: AAOX_3_, x___mood/affect appropriate        Eyes: _x__conjunctiva WNL  ___x__ Pupils equal and round        ENT: _x__ears and nose atraumatic_x__ Hearing grossly intact        Neck: _x__trachea midline, no visible masses ___thyroid without palpable mass    Resp: _x__Nml WOB____No tactile fremitus ___clear to auscultation    Cardio: _x__extremities free from edema __x__pedal pulses palpable    GI/Abdomen: _x__soft ___x__ Nontender___x___Nondistended_____HSM    Lymphatic: ___no palpable nodes in neck  ___no palpable nodes calves and feet    Skin/Wound: ___Incision, ___Dressing c/d/i,   ____surrounding tissues soft,  ___drain/chest tube present____    Muscular: EHL _5__/5  Gastrocnemius_5__/5    ___absent clubbing/cyanosis        ASSESSMENT: This is a 43y old Male with a history of lumbar radiculopathy, scheduled for Decompression L4-L5.       Recommended Treatment PLAN:  1. Dilaudid PCA 0.2mg Q6 minutes, 9mg 4hour max  2. Dilaudid 0.5mg Q2h IVP prn breakthrough pain  3. Gabapentin 300mg Po TID  Plan discussed with Dr. Huseyin Mendoza

## 2019-02-28 NOTE — PROGRESS NOTE ADULT - SUBJECTIVE AND OBJECTIVE BOX
pt seen and examined nad avss    Patient had some cervical pain this morning now asymptomatic     pe dressing cdi  nvi  power5/5   sensation grossly intact  no calf tenderness    imp sp psf    plan   ambulation pt   pain control  scd  drain  xrays  medical consult

## 2019-02-28 NOTE — CONSULT NOTE ADULT - ASSESSMENT
44 y/o male with PSF L4-S1 POD2    #Post op state:  Pain control  PT as per ortho  bowel regimen    #Leukocytosis:  Likely reactive to surgery. Cont to monitor CBC

## 2019-03-01 LAB
ANION GAP SERPL CALC-SCNC: 9 MMOL/L — SIGNIFICANT CHANGE UP (ref 5–17)
BASOPHILS # BLD AUTO: 0.01 K/UL — SIGNIFICANT CHANGE UP (ref 0–0.2)
BASOPHILS NFR BLD AUTO: 0.1 % — SIGNIFICANT CHANGE UP (ref 0–2)
BUN SERPL-MCNC: 20 MG/DL — SIGNIFICANT CHANGE UP (ref 7–23)
CALCIUM SERPL-MCNC: 9.3 MG/DL — SIGNIFICANT CHANGE UP (ref 8.4–10.5)
CHLORIDE SERPL-SCNC: 99 MMOL/L — SIGNIFICANT CHANGE UP (ref 96–108)
CO2 SERPL-SCNC: 29 MMOL/L — SIGNIFICANT CHANGE UP (ref 22–31)
CREAT SERPL-MCNC: 1.25 MG/DL — SIGNIFICANT CHANGE UP (ref 0.5–1.3)
EOSINOPHIL # BLD AUTO: 0.01 K/UL — SIGNIFICANT CHANGE UP (ref 0–0.5)
EOSINOPHIL NFR BLD AUTO: 0.1 % — SIGNIFICANT CHANGE UP (ref 0–6)
GLUCOSE SERPL-MCNC: 100 MG/DL — HIGH (ref 70–99)
HCT VFR BLD CALC: 39.3 % — SIGNIFICANT CHANGE UP (ref 39–50)
HGB BLD-MCNC: 12.6 G/DL — LOW (ref 13–17)
IMM GRANULOCYTES NFR BLD AUTO: 0.3 % — SIGNIFICANT CHANGE UP (ref 0–1.5)
LYMPHOCYTES # BLD AUTO: 1.72 K/UL — SIGNIFICANT CHANGE UP (ref 1–3.3)
LYMPHOCYTES # BLD AUTO: 18.1 % — SIGNIFICANT CHANGE UP (ref 13–44)
MCHC RBC-ENTMCNC: 28.4 PG — SIGNIFICANT CHANGE UP (ref 27–34)
MCHC RBC-ENTMCNC: 32.1 GM/DL — SIGNIFICANT CHANGE UP (ref 32–36)
MCV RBC AUTO: 88.7 FL — SIGNIFICANT CHANGE UP (ref 80–100)
MONOCYTES # BLD AUTO: 1.23 K/UL — HIGH (ref 0–0.9)
MONOCYTES NFR BLD AUTO: 12.9 % — SIGNIFICANT CHANGE UP (ref 2–14)
NEUTROPHILS # BLD AUTO: 6.51 K/UL — SIGNIFICANT CHANGE UP (ref 1.8–7.4)
NEUTROPHILS NFR BLD AUTO: 68.5 % — SIGNIFICANT CHANGE UP (ref 43–77)
NRBC # BLD: 0 /100 WBCS — SIGNIFICANT CHANGE UP (ref 0–0)
PLATELET # BLD AUTO: 216 K/UL — SIGNIFICANT CHANGE UP (ref 150–400)
POTASSIUM SERPL-MCNC: 4.2 MMOL/L — SIGNIFICANT CHANGE UP (ref 3.5–5.3)
POTASSIUM SERPL-SCNC: 4.2 MMOL/L — SIGNIFICANT CHANGE UP (ref 3.5–5.3)
RBC # BLD: 4.43 M/UL — SIGNIFICANT CHANGE UP (ref 4.2–5.8)
RBC # FLD: 13.1 % — SIGNIFICANT CHANGE UP (ref 10.3–14.5)
SODIUM SERPL-SCNC: 137 MMOL/L — SIGNIFICANT CHANGE UP (ref 135–145)
WBC # BLD: 9.51 K/UL — SIGNIFICANT CHANGE UP (ref 3.8–10.5)
WBC # FLD AUTO: 9.51 K/UL — SIGNIFICANT CHANGE UP (ref 3.8–10.5)

## 2019-03-01 PROCEDURE — 99223 1ST HOSP IP/OBS HIGH 75: CPT

## 2019-03-01 PROCEDURE — 99233 SBSQ HOSP IP/OBS HIGH 50: CPT

## 2019-03-01 RX ORDER — HYDROMORPHONE HYDROCHLORIDE 2 MG/ML
4 INJECTION INTRAMUSCULAR; INTRAVENOUS; SUBCUTANEOUS EVERY 4 HOURS
Qty: 0 | Refills: 0 | Status: DISCONTINUED | OUTPATIENT
Start: 2019-03-01 | End: 2019-03-03

## 2019-03-01 RX ORDER — HYDROMORPHONE HYDROCHLORIDE 2 MG/ML
2 INJECTION INTRAMUSCULAR; INTRAVENOUS; SUBCUTANEOUS EVERY 4 HOURS
Qty: 0 | Refills: 0 | Status: DISCONTINUED | OUTPATIENT
Start: 2019-03-01 | End: 2019-03-03

## 2019-03-01 RX ADMIN — HYDROMORPHONE HYDROCHLORIDE 4 MILLIGRAM(S): 2 INJECTION INTRAMUSCULAR; INTRAVENOUS; SUBCUTANEOUS at 22:35

## 2019-03-01 RX ADMIN — Medication 100 MILLIGRAM(S): at 05:36

## 2019-03-01 RX ADMIN — OXYCODONE HYDROCHLORIDE 10 MILLIGRAM(S): 5 TABLET ORAL at 08:00

## 2019-03-01 RX ADMIN — OXYCODONE HYDROCHLORIDE 10 MILLIGRAM(S): 5 TABLET ORAL at 16:51

## 2019-03-01 RX ADMIN — HYDROMORPHONE HYDROCHLORIDE 0.5 MILLIGRAM(S): 2 INJECTION INTRAMUSCULAR; INTRAVENOUS; SUBCUTANEOUS at 03:44

## 2019-03-01 RX ADMIN — Medication 100 MILLIGRAM(S): at 21:25

## 2019-03-01 RX ADMIN — HYDROMORPHONE HYDROCHLORIDE 0.5 MILLIGRAM(S): 2 INJECTION INTRAMUSCULAR; INTRAVENOUS; SUBCUTANEOUS at 10:00

## 2019-03-01 RX ADMIN — OXYCODONE HYDROCHLORIDE 10 MILLIGRAM(S): 5 TABLET ORAL at 11:31

## 2019-03-01 RX ADMIN — HYDROMORPHONE HYDROCHLORIDE 4 MILLIGRAM(S): 2 INJECTION INTRAMUSCULAR; INTRAVENOUS; SUBCUTANEOUS at 21:25

## 2019-03-01 RX ADMIN — HYDROMORPHONE HYDROCHLORIDE 0.5 MILLIGRAM(S): 2 INJECTION INTRAMUSCULAR; INTRAVENOUS; SUBCUTANEOUS at 03:14

## 2019-03-01 RX ADMIN — OXYCODONE HYDROCHLORIDE 10 MILLIGRAM(S): 5 TABLET ORAL at 16:08

## 2019-03-01 RX ADMIN — OXYCODONE HYDROCHLORIDE 10 MILLIGRAM(S): 5 TABLET ORAL at 07:06

## 2019-03-01 RX ADMIN — SENNA PLUS 2 TABLET(S): 8.6 TABLET ORAL at 21:25

## 2019-03-01 RX ADMIN — OXYCODONE HYDROCHLORIDE 10 MILLIGRAM(S): 5 TABLET ORAL at 12:30

## 2019-03-01 RX ADMIN — OXYCODONE HYDROCHLORIDE 10 MILLIGRAM(S): 5 TABLET ORAL at 00:32

## 2019-03-01 RX ADMIN — Medication 100 MILLIGRAM(S): at 12:42

## 2019-03-01 RX ADMIN — HYDROMORPHONE HYDROCHLORIDE 0.5 MILLIGRAM(S): 2 INJECTION INTRAMUSCULAR; INTRAVENOUS; SUBCUTANEOUS at 09:34

## 2019-03-01 RX ADMIN — OXYCODONE HYDROCHLORIDE 10 MILLIGRAM(S): 5 TABLET ORAL at 01:02

## 2019-03-01 RX ADMIN — Medication 24 MILLIGRAM(S): at 11:33

## 2019-03-01 NOTE — DIETITIAN INITIAL EVALUATION ADULT. - ENERGY NEEDS
Ht (2/26): 188cm, Wt (2/26): 89.4kg, IBW: 190# +/-10%, %IBW: 104%, BMI: 25.3   ABW used to calculate energy needs due to pt's current body weight within % IBW. Needs adjusted s/p surgery.

## 2019-03-01 NOTE — DIETITIAN INITIAL EVALUATION ADULT. - PROBLEM SELECTOR PLAN 1
Admit to orthopedics.  Presents for elective lumbar decompression and fusion L4-S1.   Medically optimized and cleared for surgery by Dr. Dansi Daugherty.

## 2019-03-01 NOTE — PROGRESS NOTE ADULT - SUBJECTIVE AND OBJECTIVE BOX
Orthopaedic Surgery Progress Note    Post-operative day #3 s/p PSF L4-S1    Subjective:     Patient seen and examined with Dr. Omalley. Patient was having some left neck/shoulder pain yesterday, resolved today. Today he complains of left lower back tightness. Has some tingling to left foot which he states is his preoperative baseline.   Denies chest pain, shortness of breath, nausea/vomiting.    Objective:    Vital Signs Last 24 Hrs  T(C): 36.1 (03-01-19 @ 08:22), Max: 36.1 (03-01-19 @ 08:22)  T(F): 97 (03-01-19 @ 08:22), Max: 97 (03-01-19 @ 08:22)  HR: 98 (03-01-19 @ 08:22) (98 - 98)  BP: 128/74 (03-01-19 @ 08:22) (128/74 - 128/74)  BP(mean): --  RR: 116 (03-01-19 @ 08:22) (116 - 116)  SpO2: 98% (03-01-19 @ 08:22) (98% - 98%)  AVSS    PE:  General: Patient alert and oriented, NAD  Dressing: Clean/dry/intact back   Pulses: DP palpable BLE  Sensation: intact to BLE  Motor: firing EHL/FHL/TA/GS BLE                          12.6   9.51  )-----------( 216      ( 01 Mar 2019 06:26 )             39.3   01 Mar 2019 06:26    137    |  99     |  20     ----------------------------<  100    4.2     |  29     |  1.25           A/P: 43yMale POD  #3 s/p PSF L4-S1    1. Pain control as needed  2. DVT prophylaxis: SCDs  3. PT, weight-bearing status:  WBAT  4. postop xrays reviewed by Dr. Omalley   5. Per Dr. Omalley, medrol dose pack ordered  6. Dispo: pending     Ortho Pager [phone] Orthopaedic Surgery Progress Note    Post-operative day #3 s/p PSF L4-S1    Subjective:     Patient seen and examined with Dr. Omalley. Patient was having some left neck/shoulder pain yesterday, resolved today. Today he complains of left lower back tightness. Has some tingling to left foot which he states is his preoperative baseline.   Denies chest pain, shortness of breath, nausea/vomiting.    Objective:    Vital Signs Last 24 Hrs  T(C): 36.1 (03-01-19 @ 08:22), Max: 36.1 (03-01-19 @ 08:22)  T(F): 97 (03-01-19 @ 08:22), Max: 97 (03-01-19 @ 08:22)  HR: 98 (03-01-19 @ 08:22) (98 - 98)  BP: 128/74 (03-01-19 @ 08:22) (128/74 - 128/74)  BP(mean): --  RR: 116 (03-01-19 @ 08:22) (116 - 116)  SpO2: 98% (03-01-19 @ 08:22) (98% - 98%)  AVSS    PE:  General: Patient alert and oriented, NAD  Dressing: Clean/dry/intact back   Pulses: DP palpable BLE  Sensation: intact to BLE  Motor: firing EHL/FHL/TA/GS BLE                          12.6   9.51  )-----------( 216      ( 01 Mar 2019 06:26 )             39.3   01 Mar 2019 06:26    137    |  99     |  20     ----------------------------<  100    4.2     |  29     |  1.25           A/P: 43yMale POD  #3 s/p PSF L4-S1    1. Pain control as needed  2. DVT prophylaxis: SCDs  3. PT, weight-bearing status:  WBAT  4. postop xrays reviewed by Dr. Omalley   5. Per Dr. Omalley, medrol dose pack ordered  6. Dispo: pending   7. Per Dr. Padilla, stepdown from telemetry to regional today     Ortho Pager [phone]

## 2019-03-01 NOTE — PROGRESS NOTE ADULT - SUBJECTIVE AND OBJECTIVE BOX
CC: Feeling well. No overnight events.   No complaints.   Denies cp, sob, dizziness.   ROS otherwise negative.     Vital Signs Last 24 Hrs  T(C): 36.1 (01 Mar 2019 08:22), Max: 37 (01 Mar 2019 05:13)  T(F): 97 (01 Mar 2019 08:22), Max: 98.6 (01 Mar 2019 05:13)  HR: 98 (01 Mar 2019 08:22) (77 - 98)  BP: 128/74 (01 Mar 2019 08:22) (126/76 - 140/99)  BP(mean): 104 (01 Mar 2019 03:19) (104 - 115)  RR: 116 (01 Mar 2019 08:22) (16 - 116)  SpO2: 98% (01 Mar 2019 08:22) (95% - 100%)    PHYSICAL EXAMINATION  * General: Not in acute distress. Awake and alert. Lying comfortably in bed.  * Neck: no JVD, supple.  * Lungs: Clear to auscultation, no rales, no wheezes.  * Cardio: Regular rate and rhythm, no murmurs, no rubs, no gallops. Good peripheral pulses.  * Abdomen: Soft, non-tender, non-distended, tympanic to percussion, no rebound, no guarding, no rigidity. Bowel sounds present. No suprapubic or CVA tenderness.  * : No Alonzo catheter   * Extremities: Acyanotic, no edema.  * Skin: Warm and dry.  * Neuro: Alert and oriented x 3. No focal deficits. Motor strength is 5/5 throughout. Sensation intact. Cranial nerves II-XII grossly intact.                           12.6   9.51  )-----------( 216      ( 01 Mar 2019 06:26 )             39.3   03-01    137  |  99  |  20  ----------------------------<  100<H>  4.2   |  29  |  1.25    Ca    9.3      01 Mar 2019 06:26    MEDICATIONS  (STANDING):  docusate sodium 100 milliGRAM(s) Oral three times a day  lactated ringers. 1000 milliLiter(s) (125 mL/Hr) IV Continuous <Continuous>  methylPREDNISolone   Oral   senna 2 Tablet(s) Oral at bedtime    MEDICATIONS  (PRN):  acetaminophen   Tablet .. 650 milliGRAM(s) Oral every 4 hours PRN Temp greater or equal to 38C (100.4F), Mild Pain (1 - 3)  aluminum hydroxide/magnesium hydroxide/simethicone Suspension 30 milliLiter(s) Oral every 12 hours PRN Indigestion  HYDROmorphone  Injectable 0.5 milliGRAM(s) IV Push every 4 hours PRN Breakthrough pain  oxyCODONE    IR 5 milliGRAM(s) Oral every 4 hours PRN Moderate Pain (4 - 6)  oxyCODONE    IR 10 milliGRAM(s) Oral every 4 hours PRN Severe Pain (7 - 10)

## 2019-03-01 NOTE — DIETITIAN INITIAL EVALUATION ADULT. - OTHER INFO
43year old Male with hx lumbar radiculopathy, now POD  #3 s/p PSF L4-S1. Pt reports good appetite yesterday but decreased today 2/2 pain, pt received pain medications prior to nutrition assessment. Pt reports good appetite at home, follows regular diet. Last BM 2/26, abdomen soft/nontender, audible bowel sounds, +flatus per flow sheet- confirmed by pt. Skin: surgical incision lower back. Encouraged pt to increase PO intake as able with emphasis on lean protein 2/2 surgery- pt receptive. Recommend optimize bowel and pain regimens. Will monitor PO intake closely and f/u per protocol.

## 2019-03-01 NOTE — PROGRESS NOTE ADULT - SUBJECTIVE AND OBJECTIVE BOX
pt seen and examined nad avss    Patient had some cervical pain this morning now asymptomatic     pe dressing cdi  nvi  power5/5   sensation grossly intact  no calf tenderness    imp sp psf    plan   ambulation pt   pain control  scd  drain  xrays  dc to home after clear pt

## 2019-03-01 NOTE — PROGRESS NOTE ADULT - ASSESSMENT
44 yo M, PMH of lumbar radiculopathy. Admitted for elective PSF L4-S1 POD-3    1) Post op state:  * Pain control  * PT eval  * bowel regimen  * IS    2) Leukocytosis:  Likely reactive to surgery.   Now resolved.     3) VTEppx  * Ambulatory.   * SCDs

## 2019-03-01 NOTE — PROGRESS NOTE ADULT - SUBJECTIVE AND OBJECTIVE BOX
Pain Management Progress Note - [facility] [phone]    HPI: Patient seen during morning rounds, in NAD. Continues to c/o pain but states oxycodone and dilaudid are effective when used.       Pertinent PMH: Pain at: _x__Back ___Neck___Knee ___Hip ___Shoulder ___ Opioid tolerance    Pain is __x_ sharp ____dull ___burning ___achy ___ Intensity: ____ mild __x__mod ____severe     Location __x___surgical site _____cervical ___x__lumbar ____abd _____upper ext____lower ext    Worse with _x___activity __x__movement _____physical therapy___ Rest    Improved with _x___medication _x___rest ____physical therapy    HYDROmorphone PCA (1 mG/mL)  HYDROmorphone PCA (1 mG/mL) Rescue Clinician Bolus  naloxone Injectable  ondansetron Injectable  gabapentin  lactated ringers.  ceFAZolin   IVPB  dexamethasone  IVPB  aluminum hydroxide/magnesium hydroxide/simethicone Suspension  ondansetron Injectable  docusate sodium  senna  ceFAZolin  Injectable.  acetaminophen   Tablet ..  oxyCODONE    IR      ROS: Const:  _-__febrile   Eyes:___ENT:___CV: _-__chest pain  Resp: __-__sob  GI:___nausea ___vomiting ____abd pain ___npo ___clears ___full diet __bm  :___ Musk: _x__pain ___spasm  Skin:___ Neuro:  ___sedation___confusion____ numbness ___weakness ___paresthesia  Psych:___anxiety  Endo:___ Heme:___Allergy:___  __x__ all other systems reviewed and negative     03-01 @ 06:2669 mL/min/1.73M2      Hemoglobin: 12.6 g/dL (03-01 @ 06:26)  Hemoglobin: 12.2 g/dL (02-28 @ 06:18)      T(C): 36.1 (03-01-19 @ 08:22), Max: 37 (03-01-19 @ 05:13)  HR: 98 (03-01-19 @ 08:22) (77 - 98)  BP: 128/74 (03-01-19 @ 08:22) (126/76 - 140/99)  RR: 116 (03-01-19 @ 08:22) (16 - 116)  SpO2: 98% (03-01-19 @ 08:22) (95% - 100%)  Wt(kg): --     PHYSICAL EXAM:  Gen Appearance: __x_no acute distress __x_appropriate       Neuro: _x__SILT feet____ EOM Intact Psych: AAOX_3_, _x__mood/affect appropriate        Eyes: _x__conjunctiva WNL  _____ Pupils equal and round        ENT: __x_ears and nose atraumatic__x_ Hearing grossly intact        Neck: __x_trachea midline, no visible masses ___thyroid without palpable mass    Resp: _x__Nml WOB____No tactile fremitus ___clear to auscultation    Cardio: __x_extremities free from edema ____pedal pulses palpable    GI/Abdomen: ___soft ____x_ Nontender__x____Nondistended_____HSM    Lymphatic: ___no palpable nodes in neck  ___no palpable nodes calves and feet    Skin/Wound: ___Incision, __x_Dressing c/d/i,   ____surrounding tissues soft,  ___drain/chest tube present____    Muscular: EHL ___5/5  Gastrocnemius__5_/5    __x_absent clubbing/cyanosis         ASSESSMENT:  This is a 43y old Male with a history of:  LUMBAR M54.16  Handoff  MEWS Score  Lumbar radiculopathy  Lumbar disc disease  Lumbar disc disease  Lumbar fusion with pedicle screw  Lumbar radiculopathy  Lumbar radiculopathy  Lumbar fusion with pedicle screw  No significant past surgical history        Recommended Treatment PLAN:    1. Continue current regimen as patient is tolerating and responding well.   Plan discussed with Dr. Lindsey

## 2019-03-02 LAB
ANION GAP SERPL CALC-SCNC: 11 MMOL/L — SIGNIFICANT CHANGE UP (ref 5–17)
BUN SERPL-MCNC: 17 MG/DL — SIGNIFICANT CHANGE UP (ref 7–23)
CALCIUM SERPL-MCNC: 9.8 MG/DL — SIGNIFICANT CHANGE UP (ref 8.4–10.5)
CHLORIDE SERPL-SCNC: 96 MMOL/L — SIGNIFICANT CHANGE UP (ref 96–108)
CO2 SERPL-SCNC: 28 MMOL/L — SIGNIFICANT CHANGE UP (ref 22–31)
CREAT SERPL-MCNC: 1.12 MG/DL — SIGNIFICANT CHANGE UP (ref 0.5–1.3)
GLUCOSE BLDC GLUCOMTR-MCNC: 104 MG/DL — HIGH (ref 70–99)
GLUCOSE BLDC GLUCOMTR-MCNC: 112 MG/DL — HIGH (ref 70–99)
GLUCOSE BLDC GLUCOMTR-MCNC: 137 MG/DL — HIGH (ref 70–99)
GLUCOSE BLDC GLUCOMTR-MCNC: 159 MG/DL — HIGH (ref 70–99)
GLUCOSE SERPL-MCNC: 111 MG/DL — HIGH (ref 70–99)
HCT VFR BLD CALC: 40.7 % — SIGNIFICANT CHANGE UP (ref 39–50)
HGB BLD-MCNC: 13.1 G/DL — SIGNIFICANT CHANGE UP (ref 13–17)
MCHC RBC-ENTMCNC: 28.3 PG — SIGNIFICANT CHANGE UP (ref 27–34)
MCHC RBC-ENTMCNC: 32.2 GM/DL — SIGNIFICANT CHANGE UP (ref 32–36)
MCV RBC AUTO: 87.9 FL — SIGNIFICANT CHANGE UP (ref 80–100)
NRBC # BLD: 0 /100 WBCS — SIGNIFICANT CHANGE UP (ref 0–0)
PLATELET # BLD AUTO: 219 K/UL — SIGNIFICANT CHANGE UP (ref 150–400)
POTASSIUM SERPL-MCNC: 4.1 MMOL/L — SIGNIFICANT CHANGE UP (ref 3.5–5.3)
POTASSIUM SERPL-SCNC: 4.1 MMOL/L — SIGNIFICANT CHANGE UP (ref 3.5–5.3)
RBC # BLD: 4.63 M/UL — SIGNIFICANT CHANGE UP (ref 4.2–5.8)
RBC # FLD: 12.8 % — SIGNIFICANT CHANGE UP (ref 10.3–14.5)
SODIUM SERPL-SCNC: 135 MMOL/L — SIGNIFICANT CHANGE UP (ref 135–145)
WBC # BLD: 11.24 K/UL — HIGH (ref 3.8–10.5)
WBC # FLD AUTO: 11.24 K/UL — HIGH (ref 3.8–10.5)

## 2019-03-02 PROCEDURE — 99233 SBSQ HOSP IP/OBS HIGH 50: CPT

## 2019-03-02 RX ORDER — MAGNESIUM HYDROXIDE 400 MG/1
30 TABLET, CHEWABLE ORAL DAILY
Qty: 0 | Refills: 0 | Status: DISCONTINUED | OUTPATIENT
Start: 2019-03-02 | End: 2019-03-03

## 2019-03-02 RX ORDER — PANTOPRAZOLE SODIUM 20 MG/1
40 TABLET, DELAYED RELEASE ORAL
Qty: 0 | Refills: 0 | Status: DISCONTINUED | OUTPATIENT
Start: 2019-03-02 | End: 2019-03-03

## 2019-03-02 RX ORDER — LANOLIN ALCOHOL/MO/W.PET/CERES
5 CREAM (GRAM) TOPICAL AT BEDTIME
Qty: 0 | Refills: 0 | Status: DISCONTINUED | OUTPATIENT
Start: 2019-03-02 | End: 2019-03-03

## 2019-03-02 RX ORDER — ONDANSETRON 8 MG/1
4 TABLET, FILM COATED ORAL EVERY 6 HOURS
Qty: 0 | Refills: 0 | Status: DISCONTINUED | OUTPATIENT
Start: 2019-03-02 | End: 2019-03-03

## 2019-03-02 RX ORDER — ONDANSETRON 8 MG/1
8 TABLET, FILM COATED ORAL EVERY 4 HOURS
Qty: 0 | Refills: 0 | Status: DISCONTINUED | OUTPATIENT
Start: 2019-03-02 | End: 2019-03-02

## 2019-03-02 RX ORDER — SODIUM CHLORIDE 9 MG/ML
1000 INJECTION INTRAMUSCULAR; INTRAVENOUS; SUBCUTANEOUS ONCE
Qty: 0 | Refills: 0 | Status: COMPLETED | OUTPATIENT
Start: 2019-03-02 | End: 2019-03-02

## 2019-03-02 RX ADMIN — HYDROMORPHONE HYDROCHLORIDE 4 MILLIGRAM(S): 2 INJECTION INTRAMUSCULAR; INTRAVENOUS; SUBCUTANEOUS at 10:30

## 2019-03-02 RX ADMIN — Medication 4 MILLIGRAM(S): at 06:37

## 2019-03-02 RX ADMIN — HYDROMORPHONE HYDROCHLORIDE 4 MILLIGRAM(S): 2 INJECTION INTRAMUSCULAR; INTRAVENOUS; SUBCUTANEOUS at 21:42

## 2019-03-02 RX ADMIN — HYDROMORPHONE HYDROCHLORIDE 4 MILLIGRAM(S): 2 INJECTION INTRAMUSCULAR; INTRAVENOUS; SUBCUTANEOUS at 22:42

## 2019-03-02 RX ADMIN — HYDROMORPHONE HYDROCHLORIDE 4 MILLIGRAM(S): 2 INJECTION INTRAMUSCULAR; INTRAVENOUS; SUBCUTANEOUS at 10:16

## 2019-03-02 RX ADMIN — HYDROMORPHONE HYDROCHLORIDE 4 MILLIGRAM(S): 2 INJECTION INTRAMUSCULAR; INTRAVENOUS; SUBCUTANEOUS at 02:48

## 2019-03-02 RX ADMIN — Medication 100 MILLIGRAM(S): at 06:37

## 2019-03-02 RX ADMIN — SODIUM CHLORIDE 2000 MILLILITER(S): 9 INJECTION INTRAMUSCULAR; INTRAVENOUS; SUBCUTANEOUS at 15:04

## 2019-03-02 RX ADMIN — Medication 10 MILLIGRAM(S): at 08:56

## 2019-03-02 RX ADMIN — Medication 5 MILLIGRAM(S): at 22:33

## 2019-03-02 RX ADMIN — HYDROMORPHONE HYDROCHLORIDE 4 MILLIGRAM(S): 2 INJECTION INTRAMUSCULAR; INTRAVENOUS; SUBCUTANEOUS at 03:48

## 2019-03-02 RX ADMIN — Medication 8 MILLIGRAM(S): at 22:33

## 2019-03-02 RX ADMIN — Medication 30 MILLILITER(S): at 05:47

## 2019-03-02 RX ADMIN — Medication 100 MILLIGRAM(S): at 15:27

## 2019-03-02 RX ADMIN — Medication 4 MILLIGRAM(S): at 15:34

## 2019-03-02 RX ADMIN — MAGNESIUM HYDROXIDE 30 MILLILITER(S): 400 TABLET, CHEWABLE ORAL at 07:41

## 2019-03-02 RX ADMIN — Medication 4 MILLIGRAM(S): at 18:26

## 2019-03-02 NOTE — PROGRESS NOTE ADULT - ASSESSMENT
42 yo M, PMH of lumbar radiculopathy. Admitted for elective PSF L4-S1 POD-4    1) Dizziness.  Likely related to dehydration.   * Will give 1liter NS bolused.   * Encourage PO intake.     2) Post op state:  * Pain control  * PT eval  * bowel regimen  * IS    3) Leukocytosis:  Likely reactive to surgery.   Now resolved.     4) VTE ppx  * Ambulatory.   * SCDs

## 2019-03-02 NOTE — PROGRESS NOTE ADULT - SUBJECTIVE AND OBJECTIVE BOX
CC: Feeling well. Dizziness after PT today.   No complaints.   Denies cp, sob, dizziness.   ROS otherwise negative.     Vital Signs Last 24 Hrs  T(C): 37 (02 Mar 2019 15:33), Max: 37.4 (01 Mar 2019 19:25)  T(F): 98.6 (02 Mar 2019 15:33), Max: 99.3 (01 Mar 2019 19:25)  HR: 94 (02 Mar 2019 15:33) (81 - 133)  BP: 114/74 (02 Mar 2019 15:33) (114/74 - 129/80)  BP(mean): --  RR: 18 (02 Mar 2019 15:33) (16 - 18)  SpO2: 100% (02 Mar 2019 15:33) (97% - 100%)    PHYSICAL EXAMINATION  * General: Not in acute distress. Awake and alert. Lying comfortably in bed.  * Neck: no JVD, supple.  * Lungs: Clear to auscultation, no rales, no wheezes.  * Cardio: Regular rate and rhythm, no murmurs, no rubs, no gallops. Good peripheral pulses.  * Abdomen: Soft, non-tender, non-distended, tympanic to percussion, no rebound, no guarding, no rigidity. Bowel sounds present. No suprapubic or CVA tenderness.  * : No Alonzo catheter   * Extremities: Acyanotic, no edema.  * Skin: Warm and dry.  * Neuro: Alert and oriented x 3. No focal deficits. Motor strength is 5/5 throughout. Sensation intact. Cranial nerves II-XII grossly intact.                           13.1   11.24 )-----------( 219      ( 02 Mar 2019 06:34 )             40.7   03-02    135  |  96  |  17  ----------------------------<  111<H>  4.1   |  28  |  1.12    Ca    9.8      02 Mar 2019 06:34    MEDICATIONS  (STANDING):  docusate sodium 100 milliGRAM(s) Oral three times a day  lactated ringers. 1000 milliLiter(s) (125 mL/Hr) IV Continuous <Continuous>  methylPREDNISolone 4 milliGRAM(s) Oral before breakfast  methylPREDNISolone 4 milliGRAM(s) Oral after lunch  methylPREDNISolone 4 milliGRAM(s) Oral after dinner  methylPREDNISolone 8 milliGRAM(s) Oral at bedtime  methylPREDNISolone   Oral   pantoprazole    Tablet 40 milliGRAM(s) Oral before breakfast  senna 2 Tablet(s) Oral at bedtime    MEDICATIONS  (PRN):  acetaminophen   Tablet .. 650 milliGRAM(s) Oral every 4 hours PRN Temp greater or equal to 38C (100.4F), Mild Pain (1 - 3)  aluminum hydroxide/magnesium hydroxide/simethicone Suspension 30 milliLiter(s) Oral every 12 hours PRN Indigestion  bisacodyl Suppository 10 milliGRAM(s) Rectal daily PRN Constipation  HYDROmorphone   Tablet 2 milliGRAM(s) Oral every 4 hours PRN Moderate Pain (4 - 6)  HYDROmorphone   Tablet 4 milliGRAM(s) Oral every 4 hours PRN Severe Pain (7 - 10)  HYDROmorphone  Injectable 0.5 milliGRAM(s) IV Push every 4 hours PRN Breakthrough pain  magnesium hydroxide Suspension 30 milliLiter(s) Oral daily PRN Constipation

## 2019-03-02 NOTE — PROGRESS NOTE ADULT - SUBJECTIVE AND OBJECTIVE BOX
pt seen and examined nad avss      pe dressing cdi  nvi  power5/5   sensation grossly intact  no calf tenderness    imp sp psf    plan   ambulation pt   pain control  scd  drain  xrays  dc to home

## 2019-03-03 VITALS
TEMPERATURE: 98 F | RESPIRATION RATE: 15 BRPM | SYSTOLIC BLOOD PRESSURE: 118 MMHG | HEART RATE: 74 BPM | OXYGEN SATURATION: 99 % | DIASTOLIC BLOOD PRESSURE: 77 MMHG

## 2019-03-03 LAB
GLUCOSE BLDC GLUCOMTR-MCNC: 116 MG/DL — HIGH (ref 70–99)
GLUCOSE BLDC GLUCOMTR-MCNC: 137 MG/DL — HIGH (ref 70–99)

## 2019-03-03 PROCEDURE — 85025 COMPLETE CBC W/AUTO DIFF WBC: CPT

## 2019-03-03 PROCEDURE — 76000 FLUOROSCOPY <1 HR PHYS/QHP: CPT

## 2019-03-03 PROCEDURE — 86900 BLOOD TYPING SEROLOGIC ABO: CPT

## 2019-03-03 PROCEDURE — 86901 BLOOD TYPING SEROLOGIC RH(D): CPT

## 2019-03-03 PROCEDURE — 36415 COLL VENOUS BLD VENIPUNCTURE: CPT

## 2019-03-03 PROCEDURE — 72100 X-RAY EXAM L-S SPINE 2/3 VWS: CPT

## 2019-03-03 PROCEDURE — 86850 RBC ANTIBODY SCREEN: CPT

## 2019-03-03 PROCEDURE — 97116 GAIT TRAINING THERAPY: CPT

## 2019-03-03 PROCEDURE — 95940 IONM IN OPERATNG ROOM 15 MIN: CPT

## 2019-03-03 PROCEDURE — 97530 THERAPEUTIC ACTIVITIES: CPT

## 2019-03-03 PROCEDURE — 80048 BASIC METABOLIC PNL TOTAL CA: CPT

## 2019-03-03 PROCEDURE — 99233 SBSQ HOSP IP/OBS HIGH 50: CPT

## 2019-03-03 PROCEDURE — 82962 GLUCOSE BLOOD TEST: CPT

## 2019-03-03 PROCEDURE — C1889: CPT

## 2019-03-03 PROCEDURE — 85027 COMPLETE CBC AUTOMATED: CPT

## 2019-03-03 PROCEDURE — C1713: CPT

## 2019-03-03 PROCEDURE — 97161 PT EVAL LOW COMPLEX 20 MIN: CPT

## 2019-03-03 RX ORDER — DOCUSATE SODIUM 100 MG
1 CAPSULE ORAL
Qty: 30 | Refills: 0 | OUTPATIENT
Start: 2019-03-03 | End: 2019-03-12

## 2019-03-03 RX ORDER — CALCIUM CARBONATE 500(1250)
1 TABLET ORAL DAILY
Qty: 0 | Refills: 0 | Status: DISCONTINUED | OUTPATIENT
Start: 2019-03-03 | End: 2019-03-03

## 2019-03-03 RX ORDER — HYDROMORPHONE HYDROCHLORIDE 2 MG/ML
1 INJECTION INTRAMUSCULAR; INTRAVENOUS; SUBCUTANEOUS
Qty: 40 | Refills: 0 | OUTPATIENT
Start: 2019-03-03 | End: 2019-03-12

## 2019-03-03 RX ORDER — IBUPROFEN 200 MG
1 TABLET ORAL
Qty: 0 | Refills: 0 | COMMUNITY

## 2019-03-03 RX ADMIN — PANTOPRAZOLE SODIUM 40 MILLIGRAM(S): 20 TABLET, DELAYED RELEASE ORAL at 05:45

## 2019-03-03 RX ADMIN — Medication 30 MILLILITER(S): at 06:50

## 2019-03-03 RX ADMIN — ONDANSETRON 4 MILLIGRAM(S): 8 TABLET, FILM COATED ORAL at 08:35

## 2019-03-03 RX ADMIN — HYDROMORPHONE HYDROCHLORIDE 4 MILLIGRAM(S): 2 INJECTION INTRAMUSCULAR; INTRAVENOUS; SUBCUTANEOUS at 06:48

## 2019-03-03 RX ADMIN — Medication 100 MILLIGRAM(S): at 05:45

## 2019-03-03 RX ADMIN — HYDROMORPHONE HYDROCHLORIDE 4 MILLIGRAM(S): 2 INJECTION INTRAMUSCULAR; INTRAVENOUS; SUBCUTANEOUS at 05:48

## 2019-03-03 RX ADMIN — Medication 1 TABLET(S): at 11:27

## 2019-03-03 RX ADMIN — Medication 4 MILLIGRAM(S): at 05:45

## 2019-03-03 NOTE — PROGRESS NOTE ADULT - PROVIDER SPECIALTY LIST ADULT
Internal Medicine
Orthopedics
Pain Medicine

## 2019-03-03 NOTE — PROGRESS NOTE ADULT - ASSESSMENT
42 yo M, PMH of lumbar radiculopathy. Admitted for elective PSF L4-S1 POD-5    1) Dizziness, now resolved.   * Encourage PO intake.     2) Abdominal pain.   Abdominal exam is unremarkable, benign.   * Zofran prn for nausea.   * Avoid opioids.    3) Post op state:  * Pain control  * PT eval  * bowel regimen  * IS    4) Leukocytosis:  Likely reactive to surgery.   Now resolved.     5) VTE ppx  * Ambulatory.   * SCDs

## 2019-03-03 NOTE — DISCHARGE NOTE NURSING/CASE MANAGEMENT/SOCIAL WORK - NSDCDPATPORTLINK_GEN_ALL_CORE
You can access the [facility] Portal, offered by [facility], by registering with the following website: [email or web address]

## 2019-03-03 NOTE — PROGRESS NOTE ADULT - SUBJECTIVE AND OBJECTIVE BOX
CC: C/o of crampy right sided abdominal pain, n/v.  Denies cp, sob, dizziness.   ROS otherwise negative.     Vital Signs Last 24 Hrs  T(C): 36.6 (03 Mar 2019 09:07), Max: 37.3 (02 Mar 2019 20:22)  T(F): 97.9 (03 Mar 2019 09:07), Max: 99.1 (02 Mar 2019 20:22)  HR: 74 (03 Mar 2019 09:07) (74 - 124)  BP: 118/77 (03 Mar 2019 09:07) (114/74 - 140/87)  BP(mean): --  RR: 15 (03 Mar 2019 09:07) (15 - 18)  SpO2: 99% (03 Mar 2019 09:07) (99% - 100%)    PHYSICAL EXAMINATION  * General: Not in acute distress. Awake and alert. Lying comfortably in bed.  * Neck: no JVD, supple.  * Lungs: Clear to auscultation, no rales, no wheezes.  * Cardio: Regular rate and rhythm, no murmurs, no rubs, no gallops. Good peripheral pulses.  * Abdomen: Soft, non-tender, non-distended, tympanic to percussion, no rebound, no guarding, no rigidity. Bowel sounds present. No suprapubic or CVA tenderness.  * : No Alonzo catheter   * Extremities: Acyanotic, no edema.  * Skin: Warm and dry.  * Neuro: Alert and oriented x 3. No focal deficits. Motor strength is 5/5 throughout. Sensation intact. Cranial nerves II-XII grossly intact.     MEDICATIONS  (STANDING):  calcium carbonate    500 mG (Tums) Chewable 1 Tablet(s) Chew daily  docusate sodium 100 milliGRAM(s) Oral three times a day  lactated ringers. 1000 milliLiter(s) (125 mL/Hr) IV Continuous <Continuous>  melatonin 5 milliGRAM(s) Oral at bedtime  methylPREDNISolone 4 milliGRAM(s) Oral before breakfast  methylPREDNISolone 4 milliGRAM(s) Oral after lunch  methylPREDNISolone 4 milliGRAM(s) Oral after dinner  methylPREDNISolone   Oral   methylPREDNISolone 4 milliGRAM(s) Oral at bedtime  pantoprazole    Tablet 40 milliGRAM(s) Oral before breakfast  senna 2 Tablet(s) Oral at bedtime    MEDICATIONS  (PRN):  acetaminophen   Tablet .. 650 milliGRAM(s) Oral every 4 hours PRN Temp greater or equal to 38C (100.4F), Mild Pain (1 - 3)  aluminum hydroxide/magnesium hydroxide/simethicone Suspension 30 milliLiter(s) Oral every 12 hours PRN Indigestion  bisacodyl Suppository 10 milliGRAM(s) Rectal daily PRN Constipation  HYDROmorphone   Tablet 2 milliGRAM(s) Oral every 4 hours PRN Moderate Pain (4 - 6)  HYDROmorphone   Tablet 4 milliGRAM(s) Oral every 4 hours PRN Severe Pain (7 - 10)  HYDROmorphone  Injectable 0.5 milliGRAM(s) IV Push every 4 hours PRN Breakthrough pain  magnesium hydroxide Suspension 30 milliLiter(s) Oral daily PRN Constipation  ondansetron Injectable 4 milliGRAM(s) IV Push every 6 hours PRN Nausea and/or Vomiting

## 2019-03-03 NOTE — PROGRESS NOTE ADULT - REASON FOR ADMISSION
lumbar spine pain

## 2019-03-05 ENCOUNTER — EMERGENCY (EMERGENCY)
Facility: HOSPITAL | Age: 44
LOS: 1 days | Discharge: HOME | End: 2019-03-05
Attending: EMERGENCY MEDICINE | Admitting: EMERGENCY MEDICINE
Payer: OTHER MISCELLANEOUS

## 2019-03-05 VITALS
DIASTOLIC BLOOD PRESSURE: 89 MMHG | TEMPERATURE: 98 F | OXYGEN SATURATION: 100 % | SYSTOLIC BLOOD PRESSURE: 138 MMHG | RESPIRATION RATE: 20 BRPM | HEART RATE: 111 BPM

## 2019-03-05 DIAGNOSIS — M54.5 LOW BACK PAIN: ICD-10-CM

## 2019-03-05 DIAGNOSIS — R07.9 CHEST PAIN, UNSPECIFIED: ICD-10-CM

## 2019-03-05 DIAGNOSIS — Z79.2 LONG TERM (CURRENT) USE OF ANTIBIOTICS: ICD-10-CM

## 2019-03-05 DIAGNOSIS — G89.18 OTHER ACUTE POSTPROCEDURAL PAIN: ICD-10-CM

## 2019-03-05 DIAGNOSIS — Z79.899 OTHER LONG TERM (CURRENT) DRUG THERAPY: ICD-10-CM

## 2019-03-05 DIAGNOSIS — Z98.1 ARTHRODESIS STATUS: ICD-10-CM

## 2019-03-05 PROCEDURE — 93010 ELECTROCARDIOGRAM REPORT: CPT

## 2019-03-05 PROCEDURE — 72100 X-RAY EXAM L-S SPINE 2/3 VWS: CPT | Mod: 26

## 2019-03-05 PROCEDURE — 99053 MED SERV 10PM-8AM 24 HR FAC: CPT

## 2019-03-05 PROCEDURE — 99284 EMERGENCY DEPT VISIT MOD MDM: CPT | Mod: 25

## 2019-03-05 RX ORDER — HYDROMORPHONE HYDROCHLORIDE 2 MG/ML
1 INJECTION INTRAMUSCULAR; INTRAVENOUS; SUBCUTANEOUS ONCE
Qty: 0 | Refills: 0 | Status: DISCONTINUED | OUTPATIENT
Start: 2019-03-05 | End: 2019-03-05

## 2019-03-05 RX ADMIN — HYDROMORPHONE HYDROCHLORIDE 1 MILLIGRAM(S): 2 INJECTION INTRAMUSCULAR; INTRAVENOUS; SUBCUTANEOUS at 23:25

## 2019-03-05 NOTE — ED PROVIDER NOTE - CLINICAL SUMMARY MEDICAL DECISION MAKING FREE TEXT BOX
43M with low back pain post op from lumbar fusion, lack of pain control despite multiple oral medications including hydromorphone and steroids, will obtain imaging, consult ortho, pt given dilaudid for pain control IV nl neuro exam of lower ext 43M with low back pain post op from lumbar fusion, lack of pain control despite multiple oral medications including hydromorphone and steroids, will obtain imaging, consult ortho, pt given dilaudid for pain control IV nl neuro exam of lower ext    1:39 AM: Pt feels fine. 43M with low back pain post op from lumbar fusion, lack of pain control despite multiple oral medications including hydromorphone and steroids, will obtain imaging, consult ortho, pt given dilaudid for pain control IV nl neuro exam of lower ext    1:39 AM: Pt feels fine. States he would like to be discharged home, discussed with Dr. Zimmerman, pt to follow up with Dr. Daugherty as outpt, start steroid taper, f/u with pain management.

## 2019-03-05 NOTE — ED PROVIDER NOTE - OBJECTIVE STATEMENT
43M post op lumbar fusion 2/26 presenting with severe back pain, radiating to chest pain not controlled despite Medrol dose pack, diclofenac, hydromorphone oral, gabapentin. Pt's ortho Dr. Daugherty, hx limited due to lack of pain control. 43M post op lumbar fusion 2/26 presenting with severe back pain, radiating to chest pain not controlled despite Medrol dose pack, diclofenac, hydromorphone oral, gabapentin. Pt's ortho Dr. Daugherty, hx limited due to lack of pain control. Upon further clarification, pt states pain is located to low back

## 2019-03-05 NOTE — ED PROVIDER NOTE - PROGRESS NOTE DETAILS
discussed with ortho res dr cole plan for lumbar spine pt evaluated by ortho, xr reviewed by ortho, pt states he feels much better and he is ready to go home.

## 2019-03-05 NOTE — ED ADULT NURSE NOTE - OBJECTIVE STATEMENT
44 y/o male s/p lumbar fusion 2 days ago arrived to [facility] ER reporting severe back pain radiating to abdomen. Pt verbalized that he is currently on pain management from his sx and new severe pain started after jumping out of bed due to a bad dream. Upon assessment, 13 staples noted to lower back intacted with dry clean dressing in place, abdomen soft, lung fields WNL, breathing is equal and unlabored, pulses palpable. pt denies chest pain, headache, dizziness, blurred vision, slurred speech, nausea, vomiting, diarrhea, fever, chills, LOC, SOB, weakness, fatigue, and palpitations. EKG performed. Care in progress.

## 2019-03-05 NOTE — ED PROVIDER NOTE - CARE PROVIDER_API CALL
Raf Daugherty (DO)  Orthopaedic Surgery  [address] [place], [place] [id number]  Phone: [phone]  Fax: [phone]  Follow Up Time:

## 2019-03-05 NOTE — ED PROVIDER NOTE - NSFOLLOWUPINSTRUCTIONS_ED_ALL_ED_FT
Follow up with Dr. Daugherty as already scheduled. Continue your pain medication regimen, start your steroid taper as directed by Dr. Daugherty. Return to the ER for any weakness, numbness, difficulty urinating, difficulty holding your bowel/bladder, or any fever or change in back pain. You need to follow up with pain management for continued management of your back pain.

## 2019-03-05 NOTE — ED ADULT NURSE NOTE - NSIMPLEMENTINTERV_GEN_ALL_ED
Implemented All Fall Risk Interventions:  [place] to call system. Call bell, personal items and telephone within reach. Instruct patient to call for assistance. Room bathroom lighting operational. Non-slip footwear when patient is off stretcher. Physically safe environment: no spills, clutter or unnecessary equipment. Stretcher in lowest position, wheels locked, appropriate side rails in place. Provide visual cue, wrist band, yellow gown, etc. Monitor gait and stability. Monitor for mental status changes and reorient to person, place, and time. Review medications for side effects contributing to fall risk. Reinforce activity limits and safety measures with patient and family.

## 2019-03-05 NOTE — ED PROVIDER NOTE - PHYSICAL EXAMINATION
gen: pt is in distress due to pain tremulous   HEENT: oropharynx clear  Neck: supple, no meningismus, no bruit noted bl carotid  CV: rrr   Resp: ctab,   Abd: nontender, no rebound/guarding  Ext: no edema, pedal pulses 2+  Neuro: alert sensation intact to light touch l4/l5/s1, reflexes 1+ symmetric patella strength 5/5 in bl le gen: pt is in distress due to pain tremulous   HEENT: oropharynx clear  Neck: supple, no meningismus, no bruit noted bl carotid  CV: rrr   Resp: ctab,   Abd: nontender, no rebound/guarding  Ext: no edema, pedal pulses 2+  Neuro: alert sensation intact to light touch l4/l5/s1, reflexes 1+ symmetric patella strength 5/5 in bl le  MSK: 13 staples in place wound c/d/i

## 2019-03-06 VITALS
DIASTOLIC BLOOD PRESSURE: 78 MMHG | HEART RATE: 79 BPM | RESPIRATION RATE: 20 BRPM | SYSTOLIC BLOOD PRESSURE: 128 MMHG | OXYGEN SATURATION: 100 % | TEMPERATURE: 98 F

## 2019-03-06 PROBLEM — M54.16 RADICULOPATHY, LUMBAR REGION: Chronic | Status: ACTIVE | Noted: 2019-02-26

## 2019-03-06 LAB
ALBUMIN SERPL ELPH-MCNC: 3.9 G/DL — SIGNIFICANT CHANGE UP (ref 3.3–5)
ALP SERPL-CCNC: 67 U/L — SIGNIFICANT CHANGE UP (ref 40–120)
ALT FLD-CCNC: 21 U/L — SIGNIFICANT CHANGE UP (ref 10–45)
ANION GAP SERPL CALC-SCNC: 15 MMOL/L — SIGNIFICANT CHANGE UP (ref 5–17)
AST SERPL-CCNC: 24 U/L — SIGNIFICANT CHANGE UP (ref 10–40)
BILIRUB SERPL-MCNC: 0.4 MG/DL — SIGNIFICANT CHANGE UP (ref 0.2–1.2)
BUN SERPL-MCNC: 16 MG/DL — SIGNIFICANT CHANGE UP (ref 7–23)
CALCIUM SERPL-MCNC: 9 MG/DL — SIGNIFICANT CHANGE UP (ref 8.4–10.5)
CHLORIDE SERPL-SCNC: 102 MMOL/L — SIGNIFICANT CHANGE UP (ref 96–108)
CO2 SERPL-SCNC: 23 MMOL/L — SIGNIFICANT CHANGE UP (ref 22–31)
CREAT SERPL-MCNC: 1.18 MG/DL — SIGNIFICANT CHANGE UP (ref 0.5–1.3)
GLUCOSE SERPL-MCNC: 132 MG/DL — HIGH (ref 70–99)
POTASSIUM SERPL-MCNC: 3.8 MMOL/L — SIGNIFICANT CHANGE UP (ref 3.5–5.3)
POTASSIUM SERPL-SCNC: 3.8 MMOL/L — SIGNIFICANT CHANGE UP (ref 3.5–5.3)
PROT SERPL-MCNC: 7 G/DL — SIGNIFICANT CHANGE UP (ref 6–8.3)
SODIUM SERPL-SCNC: 140 MMOL/L — SIGNIFICANT CHANGE UP (ref 135–145)

## 2019-03-06 PROCEDURE — 85025 COMPLETE CBC W/AUTO DIFF WBC: CPT

## 2019-03-06 PROCEDURE — 93005 ELECTROCARDIOGRAM TRACING: CPT

## 2019-03-06 PROCEDURE — 80053 COMPREHEN METABOLIC PANEL: CPT

## 2019-03-06 PROCEDURE — 99284 EMERGENCY DEPT VISIT MOD MDM: CPT | Mod: 25

## 2019-03-06 PROCEDURE — 86140 C-REACTIVE PROTEIN: CPT

## 2019-03-06 PROCEDURE — 36415 COLL VENOUS BLD VENIPUNCTURE: CPT

## 2019-03-06 PROCEDURE — 96374 THER/PROPH/DIAG INJ IV PUSH: CPT

## 2019-03-06 PROCEDURE — 86901 BLOOD TYPING SEROLOGIC RH(D): CPT

## 2019-03-06 PROCEDURE — 85652 RBC SED RATE AUTOMATED: CPT

## 2019-03-06 PROCEDURE — 81003 URINALYSIS AUTO W/O SCOPE: CPT

## 2019-03-06 PROCEDURE — 86900 BLOOD TYPING SEROLOGIC ABO: CPT

## 2019-03-06 PROCEDURE — 72100 X-RAY EXAM L-S SPINE 2/3 VWS: CPT

## 2019-03-06 PROCEDURE — 87086 URINE CULTURE/COLONY COUNT: CPT

## 2019-03-06 PROCEDURE — 84484 ASSAY OF TROPONIN QUANT: CPT

## 2019-03-06 PROCEDURE — 86850 RBC ANTIBODY SCREEN: CPT

## 2019-03-06 NOTE — CONSULT NOTE ADULT - SUBJECTIVE AND OBJECTIVE BOX
Orthopaedic Consult Note    Consult Requested by: ER  Surgeon: Willow    CC:Patient is a 43y old  Male who presents with a chief complaint of generalized body pain    HPI  43yMale  c/o body pain. Pt had L4/S1 PSF on 2/26 with Dr Daugherty. Describes shock/electric pain that occurs all over his body, sometimes in arms, sometimes in legs, sometimes in chest. Denies tingling, numbness, weakness, bowel or bladder symptoms, pain at surgical site.    PAST MEDICAL & SURGICAL HISTORY:  Lumbar radiculopathy  No significant past surgical history    Allergies    No Known Allergies    Intolerances      MEDICATIONS  (STANDING):      Vital Signs Last 24 Hrs  T(C): 37 (06 Mar 2019 00:15), Max: 37 (06 Mar 2019 00:15)  T(F): 98.6 (06 Mar 2019 00:15), Max: 98.6 (06 Mar 2019 00:15)  HR: 82 (06 Mar 2019 00:15) (82 - 111)  BP: 134/84 (06 Mar 2019 00:15) (134/84 - 138/89)  BP(mean): --  RR: 20 (06 Mar 2019 00:15) (20 - 20)  SpO2: 100% (06 Mar 2019 00:15) (100% - 100%)    Physical Exam:  General: NAD, alert & oriented x 3  Incision site looks clean, dry, and at appropriate stage of healing. Staples in skin. Dressing changed   BL LE  Motor: 5/5 GS/TA/EHL/FHL    Sensation: SILT s/sp/dp/tib  Pulses:  DP/PT 2+ , toes/foot WWP                                13.8   7.88  )-----------( 345      ( 05 Mar 2019 23:33 )             40.0     03-06    x   |  102  |  16  ----------------------------<  132<H>  3.8   |  23  |  1.18    Ca    9.0      06 Mar 2019 00:54    TPro  7.0  /  Alb  3.9  /  TBili  0.4  /  DBili  x   /  AST  24  /  ALT  21  /  AlkPhos  67  03-06      Imaging: XRs show no interval change    A/P: 43yMale w/ generalized body pain, post op L4/S1 PSF on 2/26   -pain does not appear to be coming from lumbar spine/surgical site  -hardware in place  -pain control  -follow up with Dr Daugherty   Discussed with chief/attending  Ortho Pager: [phone]

## 2019-03-07 DIAGNOSIS — D72.829 ELEVATED WHITE BLOOD CELL COUNT, UNSPECIFIED: ICD-10-CM

## 2019-03-07 DIAGNOSIS — M54.5 LOW BACK PAIN: ICD-10-CM

## 2019-03-07 DIAGNOSIS — E86.0 DEHYDRATION: ICD-10-CM

## 2019-03-07 DIAGNOSIS — M51.17 INTERVERTEBRAL DISC DISORDERS WITH RADICULOPATHY, LUMBOSACRAL REGION: ICD-10-CM

## 2021-07-20 NOTE — PATIENT PROFILE ADULT - NSPROPTRIGHTCAREGIVER_GEN_A_NUR
[FreeTextEntry1] : General: + Excessive odor of tobacco, in no acute distress, alert and oriented to person, place and time.\par Psychiatric: normal mood and affect, no abnormal movements or speech patterns.\par Eyes: vision intact without deficits, sclera and conjunctiva were normal, pupils were equal in size. \par ENT: Ears and nose were normal in appearance. No thyromegaly.\par Lymph: no enlarged nodes, no lymphedema in extremity.\par Respiratory: Normal respiratory rhythm and effort. No wheezing detected without auscultation. No shortness of breath or respiratory distress.\par Cardiac: no cardiac related leg swelling.\par Neurology: normal gross sensation in extremities to light touch.\par Abdomen: soft, non-tender, tympanic, no masses.\par \par RLE:\par \par Skin CDI. No effusion, swelling, or ecchymosis. ROM: 0-130 degrees. No varus/valgus instability. No joint line TTP. No TTP over quadriceps/patellar tendon. No TTP over tibial tubercle or pes insertion. No palpable masses. No lymphedema.\par Neg Lachman. Neg Honey's. \par Alignment: Neutral\par EHL/FHL/GS/TA 5/5. S/S/SP/DP/T SILT. Toes warm, BCR. Compartments soft. no negative...

## 2024-05-27 NOTE — PHYSICAL THERAPY INITIAL EVALUATION ADULT - MD/RN NOTIFIED
RN Shift Summary     Patient presented with an anxious/depressed affect. He remained isolated to his room for most of the shift. He was much calmer than noted on previous shift. He was intermittently talkative with [facility] staff, resting, and engaging in independent activities in his room, including meditation and puzzles. He took all scheduled medications without issue. He reported waking up with anxiety in the middle of the night but sleeping well before and after this incident. Patient continues to endorse prominent pain in right shoulder, lower back, and bilateral knees. Writer assisted patient in applying Voltaren gel to shoulder and lower back upon request. PRN Flexeril also provided midday to good effect. Patient requested PRN clonazepam for severe anxiety midday as well. Patient continues to endorse SI, stating he still thinks about euthanasia and thinks about ending his life if he gets any more bad news. Patient also shared another form of SIB that he sometimes engages in overnight, stating that his restless legs become so severe that he punches his legs until he gets bruises. SIO 1:1 5 feet observation remains for patient safety.     Vitals: B/P: 158/89, T: 97.6, P: 76, R: 18     yes